# Patient Record
Sex: FEMALE | Race: WHITE | NOT HISPANIC OR LATINO | Employment: OTHER | URBAN - METROPOLITAN AREA
[De-identification: names, ages, dates, MRNs, and addresses within clinical notes are randomized per-mention and may not be internally consistent; named-entity substitution may affect disease eponyms.]

---

## 2020-04-16 ENCOUNTER — TRANSCRIBE ORDERS (OUTPATIENT)
Dept: ADMINISTRATIVE | Facility: HOSPITAL | Age: 75
End: 2020-04-16

## 2020-04-16 DIAGNOSIS — R13.14 DYSPHAGIA, PHARYNGOESOPHAGEAL PHASE: Primary | ICD-10-CM

## 2020-05-18 ENCOUNTER — HOSPITAL ENCOUNTER (OUTPATIENT)
Dept: RADIOLOGY | Facility: HOSPITAL | Age: 75
Discharge: HOME | End: 2020-05-18
Attending: OTOLARYNGOLOGY
Payer: MEDICARE

## 2020-05-18 DIAGNOSIS — R13.14 DYSPHAGIA, PHARYNGOESOPHAGEAL PHASE: ICD-10-CM

## 2020-05-18 PROCEDURE — 74220 X-RAY XM ESOPHAGUS 1CNTRST: CPT

## 2020-11-27 ENCOUNTER — APPOINTMENT (EMERGENCY)
Dept: CT IMAGING | Facility: HOSPITAL | Age: 75
End: 2020-11-27
Payer: MEDICARE

## 2020-11-27 ENCOUNTER — HOSPITAL ENCOUNTER (EMERGENCY)
Facility: HOSPITAL | Age: 75
Discharge: HOME | End: 2020-11-27
Attending: EMERGENCY MEDICINE | Admitting: EMERGENCY MEDICINE
Payer: MEDICARE

## 2020-11-27 VITALS
HEIGHT: 60 IN | OXYGEN SATURATION: 96 % | TEMPERATURE: 97.7 F | SYSTOLIC BLOOD PRESSURE: 167 MMHG | RESPIRATION RATE: 16 BRPM | WEIGHT: 181.88 LBS | BODY MASS INDEX: 35.71 KG/M2 | HEART RATE: 68 BPM | DIASTOLIC BLOOD PRESSURE: 87 MMHG

## 2020-11-27 DIAGNOSIS — R06.00 DYSPNEA: Primary | ICD-10-CM

## 2020-11-27 LAB
ALBUMIN SERPL BCP-MCNC: 3.8 G/DL (ref 3.5–5)
ALP SERPL-CCNC: 53 U/L (ref 46–116)
ALT SERPL W P-5'-P-CCNC: 34 U/L (ref 12–78)
ANION GAP SERPL CALCULATED.3IONS-SCNC: 8 MMOL/L (ref 4–13)
AST SERPL W P-5'-P-CCNC: 20 U/L (ref 5–45)
BASOPHILS # BLD AUTO: 0.03 THOUSANDS/ΜL (ref 0–0.1)
BASOPHILS NFR BLD AUTO: 0 % (ref 0–1)
BILIRUB SERPL-MCNC: 0.37 MG/DL (ref 0.2–1)
BUN SERPL-MCNC: 21 MG/DL (ref 5–25)
CALCIUM SERPL-MCNC: 9.7 MG/DL (ref 8.3–10.1)
CHLORIDE SERPL-SCNC: 101 MMOL/L (ref 100–108)
CO2 SERPL-SCNC: 28 MMOL/L (ref 21–32)
CREAT SERPL-MCNC: 1.01 MG/DL (ref 0.6–1.3)
EOSINOPHIL # BLD AUTO: 0.25 THOUSAND/ΜL (ref 0–0.61)
EOSINOPHIL NFR BLD AUTO: 2 % (ref 0–6)
ERYTHROCYTE [DISTWIDTH] IN BLOOD BY AUTOMATED COUNT: 12.8 % (ref 11.6–15.1)
GFR SERPL CREATININE-BSD FRML MDRD: 55 ML/MIN/1.73SQ M
GLUCOSE SERPL-MCNC: 171 MG/DL (ref 65–140)
HCT VFR BLD AUTO: 39.6 % (ref 34.8–46.1)
HGB BLD-MCNC: 12.9 G/DL (ref 11.5–15.4)
IMM GRANULOCYTES # BLD AUTO: 0.03 THOUSAND/UL (ref 0–0.2)
IMM GRANULOCYTES NFR BLD AUTO: 0 % (ref 0–2)
LYMPHOCYTES # BLD AUTO: 3.82 THOUSANDS/ΜL (ref 0.6–4.47)
LYMPHOCYTES NFR BLD AUTO: 37 % (ref 14–44)
MCH RBC QN AUTO: 27.4 PG (ref 26.8–34.3)
MCHC RBC AUTO-ENTMCNC: 32.6 G/DL (ref 31.4–37.4)
MCV RBC AUTO: 84 FL (ref 82–98)
MONOCYTES # BLD AUTO: 0.57 THOUSAND/ΜL (ref 0.17–1.22)
MONOCYTES NFR BLD AUTO: 6 % (ref 4–12)
NEUTROPHILS # BLD AUTO: 5.55 THOUSANDS/ΜL (ref 1.85–7.62)
NEUTS SEG NFR BLD AUTO: 55 % (ref 43–75)
NRBC BLD AUTO-RTO: 0 /100 WBCS
NT-PROBNP SERPL-MCNC: 107 PG/ML
PLATELET # BLD AUTO: 276 THOUSANDS/UL (ref 149–390)
PMV BLD AUTO: 9.7 FL (ref 8.9–12.7)
POTASSIUM SERPL-SCNC: 3.9 MMOL/L (ref 3.5–5.3)
PROT SERPL-MCNC: 7.2 G/DL (ref 6.4–8.2)
RBC # BLD AUTO: 4.71 MILLION/UL (ref 3.81–5.12)
SODIUM SERPL-SCNC: 137 MMOL/L (ref 136–145)
TROPONIN I SERPL-MCNC: <0.02 NG/ML
WBC # BLD AUTO: 10.25 THOUSAND/UL (ref 4.31–10.16)

## 2020-11-27 PROCEDURE — 85025 COMPLETE CBC W/AUTO DIFF WBC: CPT | Performed by: EMERGENCY MEDICINE

## 2020-11-27 PROCEDURE — 99285 EMERGENCY DEPT VISIT HI MDM: CPT

## 2020-11-27 PROCEDURE — 71275 CT ANGIOGRAPHY CHEST: CPT

## 2020-11-27 PROCEDURE — 36415 COLL VENOUS BLD VENIPUNCTURE: CPT | Performed by: EMERGENCY MEDICINE

## 2020-11-27 PROCEDURE — 93005 ELECTROCARDIOGRAM TRACING: CPT

## 2020-11-27 PROCEDURE — 83880 ASSAY OF NATRIURETIC PEPTIDE: CPT | Performed by: EMERGENCY MEDICINE

## 2020-11-27 PROCEDURE — 84484 ASSAY OF TROPONIN QUANT: CPT | Performed by: EMERGENCY MEDICINE

## 2020-11-27 PROCEDURE — 80053 COMPREHEN METABOLIC PANEL: CPT | Performed by: EMERGENCY MEDICINE

## 2020-11-27 PROCEDURE — G1004 CDSM NDSC: HCPCS

## 2020-11-27 PROCEDURE — 99285 EMERGENCY DEPT VISIT HI MDM: CPT | Performed by: EMERGENCY MEDICINE

## 2020-11-27 RX ORDER — INSULIN GLARGINE 100 [IU]/ML
INJECTION, SOLUTION SUBCUTANEOUS
COMMUNITY

## 2020-11-27 RX ORDER — DULOXETIN HYDROCHLORIDE 60 MG/1
120 CAPSULE, DELAYED RELEASE ORAL
COMMUNITY

## 2020-11-27 RX ORDER — LANOLIN ALCOHOL/MO/W.PET/CERES
CREAM (GRAM) TOPICAL
COMMUNITY

## 2020-11-27 RX ORDER — HYDROCHLOROTHIAZIDE 25 MG/1
25 TABLET ORAL
COMMUNITY
Start: 2017-04-28

## 2020-11-27 RX ORDER — LOSARTAN POTASSIUM 50 MG/1
25 TABLET ORAL
COMMUNITY
Start: 2020-07-06

## 2020-11-27 RX ORDER — ROSUVASTATIN CALCIUM 40 MG/1
TABLET, COATED ORAL
COMMUNITY
Start: 2018-10-24

## 2020-11-27 RX ORDER — OMEPRAZOLE 10 MG/1
CAPSULE, DELAYED RELEASE ORAL
COMMUNITY

## 2020-11-27 RX ORDER — UBIDECARENONE 200 MG
CAPSULE ORAL
COMMUNITY

## 2020-11-27 RX ADMIN — IOHEXOL 85 ML: 350 INJECTION, SOLUTION INTRAVENOUS at 15:08

## 2020-11-29 LAB
ATRIAL RATE: 71 BPM
P AXIS: 44 DEGREES
PR INTERVAL: 148 MS
QRS AXIS: -50 DEGREES
QRSD INTERVAL: 134 MS
QT INTERVAL: 466 MS
QTC INTERVAL: 506 MS
T WAVE AXIS: 73 DEGREES
VENTRICULAR RATE: 71 BPM

## 2020-11-29 PROCEDURE — 93010 ELECTROCARDIOGRAM REPORT: CPT | Performed by: INTERNAL MEDICINE

## 2021-07-30 ENCOUNTER — APPOINTMENT (EMERGENCY)
Dept: CT IMAGING | Facility: HOSPITAL | Age: 76
End: 2021-07-30
Payer: MEDICARE

## 2021-07-30 ENCOUNTER — HOSPITAL ENCOUNTER (EMERGENCY)
Facility: HOSPITAL | Age: 76
Discharge: HOME | End: 2021-07-30
Attending: EMERGENCY MEDICINE | Admitting: EMERGENCY MEDICINE
Payer: MEDICARE

## 2021-07-30 VITALS
WEIGHT: 189.6 LBS | TEMPERATURE: 97.2 F | DIASTOLIC BLOOD PRESSURE: 76 MMHG | HEART RATE: 62 BPM | SYSTOLIC BLOOD PRESSURE: 150 MMHG | RESPIRATION RATE: 20 BRPM | HEIGHT: 61 IN | BODY MASS INDEX: 35.8 KG/M2 | OXYGEN SATURATION: 94 %

## 2021-07-30 DIAGNOSIS — R06.00 DYSPNEA: ICD-10-CM

## 2021-07-30 DIAGNOSIS — R07.9 CHEST PAIN: Primary | ICD-10-CM

## 2021-07-30 LAB
ALBUMIN SERPL BCP-MCNC: 3.7 G/DL (ref 3.5–5)
ALP SERPL-CCNC: 71 U/L (ref 46–116)
ALT SERPL W P-5'-P-CCNC: 37 U/L (ref 12–78)
ANION GAP SERPL CALCULATED.3IONS-SCNC: 11 MMOL/L (ref 4–13)
AST SERPL W P-5'-P-CCNC: 19 U/L (ref 5–45)
ATRIAL RATE: 65 BPM
BASOPHILS # BLD AUTO: 0.04 THOUSANDS/ΜL (ref 0–0.1)
BASOPHILS NFR BLD AUTO: 0 % (ref 0–1)
BILIRUB SERPL-MCNC: 0.35 MG/DL (ref 0.2–1)
BUN SERPL-MCNC: 21 MG/DL (ref 5–25)
CALCIUM SERPL-MCNC: 9.3 MG/DL (ref 8.3–10.1)
CHLORIDE SERPL-SCNC: 104 MMOL/L (ref 100–108)
CO2 SERPL-SCNC: 27 MMOL/L (ref 21–32)
CREAT SERPL-MCNC: 1.1 MG/DL (ref 0.6–1.3)
EOSINOPHIL # BLD AUTO: 0.27 THOUSAND/ΜL (ref 0–0.61)
EOSINOPHIL NFR BLD AUTO: 3 % (ref 0–6)
ERYTHROCYTE [DISTWIDTH] IN BLOOD BY AUTOMATED COUNT: 13.1 % (ref 11.6–15.1)
GFR SERPL CREATININE-BSD FRML MDRD: 50 ML/MIN/1.73SQ M
GLUCOSE SERPL-MCNC: 81 MG/DL (ref 65–140)
HCT VFR BLD AUTO: 37.6 % (ref 34.8–46.1)
HGB BLD-MCNC: 12.4 G/DL (ref 11.5–15.4)
IMM GRANULOCYTES # BLD AUTO: 0.02 THOUSAND/UL (ref 0–0.2)
IMM GRANULOCYTES NFR BLD AUTO: 0 % (ref 0–2)
LYMPHOCYTES # BLD AUTO: 3.79 THOUSANDS/ΜL (ref 0.6–4.47)
LYMPHOCYTES NFR BLD AUTO: 39 % (ref 14–44)
MCH RBC QN AUTO: 27.4 PG (ref 26.8–34.3)
MCHC RBC AUTO-ENTMCNC: 33 G/DL (ref 31.4–37.4)
MCV RBC AUTO: 83 FL (ref 82–98)
MONOCYTES # BLD AUTO: 0.6 THOUSAND/ΜL (ref 0.17–1.22)
MONOCYTES NFR BLD AUTO: 6 % (ref 4–12)
NEUTROPHILS # BLD AUTO: 4.92 THOUSANDS/ΜL (ref 1.85–7.62)
NEUTS SEG NFR BLD AUTO: 52 % (ref 43–75)
NRBC BLD AUTO-RTO: 0 /100 WBCS
NT-PROBNP SERPL-MCNC: 255 PG/ML
P AXIS: 41 DEGREES
PLATELET # BLD AUTO: 281 THOUSANDS/UL (ref 149–390)
PMV BLD AUTO: 9.9 FL (ref 8.9–12.7)
POTASSIUM SERPL-SCNC: 4 MMOL/L (ref 3.5–5.3)
PR INTERVAL: 156 MS
PROT SERPL-MCNC: 7.2 G/DL (ref 6.4–8.2)
QRS AXIS: -57 DEGREES
QRSD INTERVAL: 134 MS
QT INTERVAL: 488 MS
QTC INTERVAL: 507 MS
RBC # BLD AUTO: 4.52 MILLION/UL (ref 3.81–5.12)
SODIUM SERPL-SCNC: 142 MMOL/L (ref 136–145)
T WAVE AXIS: 30 DEGREES
TROPONIN I SERPL-MCNC: <0.02 NG/ML
TROPONIN I SERPL-MCNC: <0.02 NG/ML
VENTRICULAR RATE: 65 BPM
WBC # BLD AUTO: 9.64 THOUSAND/UL (ref 4.31–10.16)

## 2021-07-30 PROCEDURE — 85025 COMPLETE CBC W/AUTO DIFF WBC: CPT | Performed by: EMERGENCY MEDICINE

## 2021-07-30 PROCEDURE — 99284 EMERGENCY DEPT VISIT MOD MDM: CPT | Performed by: EMERGENCY MEDICINE

## 2021-07-30 PROCEDURE — 83880 ASSAY OF NATRIURETIC PEPTIDE: CPT | Performed by: EMERGENCY MEDICINE

## 2021-07-30 PROCEDURE — 99285 EMERGENCY DEPT VISIT HI MDM: CPT

## 2021-07-30 PROCEDURE — 93005 ELECTROCARDIOGRAM TRACING: CPT

## 2021-07-30 PROCEDURE — 71275 CT ANGIOGRAPHY CHEST: CPT

## 2021-07-30 PROCEDURE — 36415 COLL VENOUS BLD VENIPUNCTURE: CPT | Performed by: EMERGENCY MEDICINE

## 2021-07-30 PROCEDURE — 84484 ASSAY OF TROPONIN QUANT: CPT | Performed by: EMERGENCY MEDICINE

## 2021-07-30 PROCEDURE — 80053 COMPREHEN METABOLIC PANEL: CPT | Performed by: EMERGENCY MEDICINE

## 2021-07-30 RX ORDER — ASPIRIN 81 MG/1
81 TABLET, CHEWABLE ORAL ONCE
Status: COMPLETED | OUTPATIENT
Start: 2021-07-30 | End: 2021-07-30

## 2021-07-30 RX ADMIN — IOHEXOL 100 ML: 350 INJECTION, SOLUTION INTRAVENOUS at 17:26

## 2021-07-30 RX ADMIN — ASPIRIN 81 MG: 81 TABLET, CHEWABLE ORAL at 16:34

## 2021-07-30 NOTE — DISCHARGE INSTRUCTIONS
Return immediately if worse or any new symptoms or reconsider hospitalization for further evaluation  Please call your physician as soon as possible to arrange evaluation

## 2021-07-30 NOTE — ED PROVIDER NOTES
History  Chief Complaint   Patient presents with    Chest Pain     pt c/o chest pressure/heaviness, mid back pain, and sob since sunday  states worse today at time of EMS arrival  denies any now  took 162 mg of aspirin before coming     14-year-old female complains of exertional dyspnea this morning during multiple activities, began when exited bed walking to the kitchen, after vacuuming deck carpet had some back pain radiating to neck and epigastric heaviness  This occurred around 8:00 a m  Lasted 15-20 minutes, was without associated symptoms  Past medical history includes CAD, CABG 2007  No VTE  Recent spinal compression fracture      History provided by:  Patient  Chest Pain  Pain location:  Substernal area  Pain quality: dull and pressure    Pain radiates to the back: no    Pain severity:  Moderate  Onset quality:  Gradual  Progression:  Resolved  Relieved by:  None tried  Associated symptoms: shortness of breath    Associated symptoms: no abdominal pain, no diaphoresis and no fever    Risk factors: coronary artery disease    Risk factors: no prior DVT/PE        Prior to Admission Medications   Prescriptions Last Dose Informant Patient Reported? Taking?    Aspirin Buf,CaCarb-MgCarb-MgO, 81 MG TABS   Yes No   Sig: Take 81 mg by mouth   Coenzyme Q10 (Co Q10 Maximum Strength) 200 MG capsule   Yes No   Sig: Take by mouth   DULoxetine (CYMBALTA) 60 mg delayed release capsule   Yes No   Sig: Take 120 mg by mouth   calcium citrate-vitamin D (Calcium + D) 315-200 MG-UNIT per tablet   Yes No   Sig: Take by mouth   hydrochlorothiazide (HYDRODIURIL) 25 mg tablet   Yes No   Sig: Take 25 mg by mouth   insulin glargine (Lantus SoloStar) 100 units/mL injection pen   Yes No   losartan (COZAAR) 50 mg tablet   Yes No   Sig: Take 25 mg by mouth   metFORMIN (GLUCOPHAGE) 850 mg tablet   Yes No   Sig: Take 850 mg by mouth 2 (two) times a day with meals   omeprazole (PriLOSEC) 10 mg delayed release capsule   Yes No   Sig: Take by mouth   rosuvastatin (CRESTOR) 40 MG tablet   Yes No   sitaGLIPtin (Januvia) 100 mg tablet   Yes No      Facility-Administered Medications: None       Past Medical History:   Diagnosis Date    Coronary artery disease     Diabetes mellitus ([facility] [address] )     Hypertension     Lyme disease        Past Surgical History:   Procedure Laterality Date    CORONARY ARTERY BYPASS GRAFT      x3    FRACTURE SURGERY Left     knee    HYSTERECTOMY         History reviewed  No pertinent family history  I have reviewed and agree with the history as documented  E-Cigarette/Vaping    E-Cigarette Use Never User      E-Cigarette/Vaping Substances     Social History     Tobacco Use    Smoking status: Never Smoker    Smokeless tobacco: Never Used   Vaping Use    Vaping Use: Never used   Substance Use Topics    Alcohol use: Not Currently    Drug use: Not Currently       Review of Systems   Constitutional: Negative for diaphoresis and fever  Respiratory: Positive for shortness of breath  Cardiovascular: Positive for chest pain  Gastrointestinal: Negative for abdominal pain  All other systems reviewed and are negative  Physical Exam  Physical Exam  Vitals and nursing note reviewed  Constitutional:       Comments: Pleasant, comfortable-appearing   HENT:      Head: Normocephalic and atraumatic  Eyes:      Conjunctiva/sclera: Conjunctivae normal       Pupils: Pupils are equal, round, and reactive to light  Cardiovascular:      Rate and Rhythm: Normal rate and regular rhythm  Heart sounds: Normal heart sounds  Pulmonary:      Effort: Pulmonary effort is normal       Breath sounds: Normal breath sounds  No rales  Chest:      Chest wall: No tenderness  Abdominal:      General: Bowel sounds are normal  There is no distension  Palpations: Abdomen is soft  Tenderness: There is no abdominal tenderness  Musculoskeletal:         General: Normal range of motion  Cervical back: Neck supple        Right lower leg: No tenderness  Edema present  Left lower leg: No tenderness  Edema present  Skin:     General: Skin is warm and dry  Neurological:      Mental Status: She is alert and oriented to person, place, and time  Cranial Nerves: No cranial nerve deficit  Coordination: Coordination normal    Psychiatric:         Behavior: Behavior normal          Thought Content:  Thought content normal          Judgment: Judgment normal          Vital Signs  ED Triage Vitals [07/30/21 1601]   Temperature Pulse Respirations Blood Pressure SpO2   97 5 °F (36 4 °C) 66 20 (!) 194/91 97 %      Temp Source Heart Rate Source Patient Position - Orthostatic VS BP Location FiO2 (%)   Temporal Monitor Lying Right arm --      Pain Score       No Pain           Vitals:    07/30/21 1729 07/30/21 1730 07/30/21 1745 07/30/21 1800   BP: 170/75 162/71 157/80 162/80   Pulse: 67 66 62 65   Patient Position - Orthostatic VS: Sitting Lying  Lying         Visual Acuity      ED Medications  Medications   aspirin chewable tablet 81 mg (81 mg Oral Given 7/30/21 1634)   iohexol (OMNIPAQUE) 350 MG/ML injection (SINGLE-DOSE) 100 mL (100 mL Intravenous Given 7/30/21 1726)       Diagnostic Studies  Results Reviewed     Procedure Component Value Units Date/Time    Troponin I [[id number]]  (Normal) Collected: 07/30/21 1805    Lab Status: Final result Specimen: Blood from Arm, Right Updated: 07/30/21 1830     Troponin I <0 02 ng/mL     NT-BNP PRO [[id number]]  (Abnormal) Collected: 07/30/21 1631    Lab Status: Final result Specimen: Blood from Arm, Right Updated: 07/30/21 1702     NT-proBNP 255 pg/mL     Comprehensive metabolic panel [[id number]] Collected: 07/30/21 1631    Lab Status: Final result Specimen: Blood from Arm, Right Updated: 07/30/21 1702     Sodium 142 mmol/L      Potassium 4 0 mmol/L      Chloride 104 mmol/L      CO2 27 mmol/L      ANION GAP 11 mmol/L      BUN 21 mg/dL      Creatinine 1 10 mg/dL      Glucose 81 mg/dL      Calcium 9 3 mg/dL      AST 19 U/L      ALT 37 U/L      Alkaline Phosphatase 71 U/L      Total Protein 7 2 g/dL      Albumin 3 7 g/dL      Total Bilirubin 0 35 mg/dL      eGFR 50 ml/min/1 73sq m     Narrative:      National Kid[employer] guidelines for Chronic Kidney Disease (CKD):     Stage 1 with normal or high GFR (GFR > 90 mL/min/1 73 square meters)    Stage 2 Mild CKD (GFR = 60-89 mL/min/1 73 square meters)    Stage 3A Moderate CKD (GFR = 45-59 mL/min/1 73 square meters)    Stage 3B Moderate CKD (GFR = 30-44 mL/min/1 73 square meters)    Stage 4 Severe CKD (GFR = 15-29 mL/min/1 73 square meters)    Stage 5 End Stage CKD (GFR <15 mL/min/1 73 square meters)  Note: GFR calculation is accurate only with a steady state creatinine    Troponin I [[id number]]  (Normal) Collected: 07/30/21 1631    Lab Status: Final result Specimen: Blood from Arm, Right Updated: 07/30/21 1700     Troponin I <0 02 ng/mL     CBC and differential [[id number]] Collected: 07/30/21 1631    Lab Status: Final result Specimen: Blood from Arm, Right Updated: 07/30/21 1639     WBC 9 64 Thousand/uL      RBC 4 52 Million/uL      Hemoglobin 12 4 g/dL      Hematocrit 37 6 %      MCV 83 fL      MCH 27 4 pg      MCHC 33 0 g/dL      RDW 13 1 %      MPV 9 9 fL      Platelets 058 Thousands/uL      nRBC 0 /100 WBCs      Neutrophils Relative 52 %      Immat GRANS % 0 %      Lymphocytes Relative 39 %      Monocytes Relative 6 %      Eosinophils Relative 3 %      Basophils Relative 0 %      Neutrophils Absolute 4 92 Thousands/µL      Immature Grans Absolute 0 02 Thousand/uL      Lymphocytes Absolute 3 79 Thousands/µL      Monocytes Absolute 0 60 Thousand/µL      Eosinophils Absolute 0 27 Thousand/µL      Basophils Absolute 0 04 Thousands/µL                  CTA ED chest PE study   Final Result by Ayana Jackson MD (07/30 8863)      No acute findings; no pulmonary arterial embolism or pulmonary infiltrate/consolidation                    Workstation performed: [id number]                    Procedures  Procedures         ED Course  ED Course as of Jul 30 1851 Fri Jul 30, 2021   1619 EKG 4:00 p m  Normal sinus rhythm rate 65 left axis intraventricular conduction delay anterior T-wave inversions no ST elevation or depression interpreted by me      1726 Troponin I: <0 02   1727 WBC: 9 64   1800 Currently comfortable except mild neck ache with movement, no abdominal pain, abdomen benign, discussed observation and request discharge home, agreeable to repeating troponin, son present and supportive      1843 Remains comfortable, we discussed options including hospitalization, again choose is discharged home, son present and supportive                                              MDM    Disposition  Final diagnoses:   Chest pain   Dyspnea     Time reflects when diagnosis was documented in both MDM as applicable and the Disposition within this note     Time User Action Codes Description Comment    7/30/2021  6:45 PM Merline Poor Add [R07 9] Chest pain     7/30/2021  6:45 PM Merline Poor Add [R06 00] Dyspnea       ED Disposition     ED Disposition Condition Date/Time Comment    Discharge Stable Fri Jul 30, 2021  6:45 PM Haris Cristobal discharge to home/self care  Follow-up Information     Follow up With Specialties Details Why Contact Info    Noemí Leal MD Internal Medicine Schedule an appointment as soon as possible for a visit in 1 week  [address] [place] [address]  [phone]            Patient's Medications   Discharge Prescriptions    No medications on file     No discharge procedures on file      PDMP Review     None          ED Provider  Electronically Signed by           Virgilio Adkins DO  07/30/21 4821

## 2024-01-08 ENCOUNTER — HOSPITAL ENCOUNTER (EMERGENCY)
Facility: HOSPITAL | Age: 79
Discharge: HOME | DRG: 392 | End: 2024-01-08
Attending: EMERGENCY MEDICINE
Payer: MEDICARE

## 2024-01-08 ENCOUNTER — APPOINTMENT (EMERGENCY)
Dept: CT IMAGING | Facility: HOSPITAL | Age: 79
DRG: 392 | End: 2024-01-08
Payer: MEDICARE

## 2024-01-08 VITALS
SYSTOLIC BLOOD PRESSURE: 144 MMHG | OXYGEN SATURATION: 95 % | DIASTOLIC BLOOD PRESSURE: 76 MMHG | TEMPERATURE: 97.8 F | RESPIRATION RATE: 16 BRPM | HEART RATE: 87 BPM

## 2024-01-08 DIAGNOSIS — K57.92 DIVERTICULITIS: ICD-10-CM

## 2024-01-08 DIAGNOSIS — R10.32 LEFT LOWER QUADRANT ABDOMINAL PAIN: Primary | ICD-10-CM

## 2024-01-08 LAB
ALBUMIN SERPL BCP-MCNC: 4.1 G/DL (ref 3.5–5)
ALP SERPL-CCNC: 51 U/L (ref 34–104)
ALT SERPL W P-5'-P-CCNC: 12 U/L (ref 7–52)
ANION GAP SERPL CALCULATED.3IONS-SCNC: 8 MMOL/L
AST SERPL W P-5'-P-CCNC: 27 U/L (ref 13–39)
BASOPHILS # BLD AUTO: 0.04 THOUSANDS/ÂΜL (ref 0–0.1)
BASOPHILS NFR BLD AUTO: 0 % (ref 0–1)
BILIRUB SERPL-MCNC: 0.58 MG/DL (ref 0.2–1)
BUN SERPL-MCNC: 18 MG/DL (ref 5–25)
CALCIUM SERPL-MCNC: 9.6 MG/DL (ref 8.4–10.2)
CHLORIDE SERPL-SCNC: 103 MMOL/L (ref 96–108)
CO2 SERPL-SCNC: 25 MMOL/L (ref 21–32)
CREAT SERPL-MCNC: 0.89 MG/DL (ref 0.6–1.3)
EOSINOPHIL # BLD AUTO: 0.07 THOUSAND/ÂΜL (ref 0–0.61)
EOSINOPHIL NFR BLD AUTO: 1 % (ref 0–6)
ERYTHROCYTE [DISTWIDTH] IN BLOOD BY AUTOMATED COUNT: 16.3 % (ref 11.6–15.1)
GFR SERPL CREATININE-BSD FRML MDRD: 63 ML/MIN/1.73SQ M
GLUCOSE SERPL-MCNC: 121 MG/DL (ref 65–140)
HCT VFR BLD AUTO: 42.8 % (ref 34.8–46.1)
HGB BLD-MCNC: 13.3 G/DL (ref 11.5–15.4)
IMM GRANULOCYTES # BLD AUTO: 0.05 THOUSAND/UL (ref 0–0.2)
IMM GRANULOCYTES NFR BLD AUTO: 0 % (ref 0–2)
LACTATE SERPL-SCNC: 1 MMOL/L (ref 0.5–2)
LYMPHOCYTES # BLD AUTO: 2.07 THOUSANDS/ÂΜL (ref 0.6–4.47)
LYMPHOCYTES NFR BLD AUTO: 18 % (ref 14–44)
MCH RBC QN AUTO: 25.8 PG (ref 26.8–34.3)
MCHC RBC AUTO-ENTMCNC: 31.1 G/DL (ref 31.4–37.4)
MCV RBC AUTO: 83 FL (ref 82–98)
MONOCYTES # BLD AUTO: 0.56 THOUSAND/ÂΜL (ref 0.17–1.22)
MONOCYTES NFR BLD AUTO: 5 % (ref 4–12)
NEUTROPHILS # BLD AUTO: 8.46 THOUSANDS/ÂΜL (ref 1.85–7.62)
NEUTS SEG NFR BLD AUTO: 76 % (ref 43–75)
NRBC BLD AUTO-RTO: 0 /100 WBCS
PLATELET # BLD AUTO: 228 THOUSANDS/UL (ref 149–390)
PMV BLD AUTO: 10 FL (ref 8.9–12.7)
POTASSIUM SERPL-SCNC: 5.7 MMOL/L (ref 3.5–5.3)
PROT SERPL-MCNC: 7.5 G/DL (ref 6.4–8.4)
RBC # BLD AUTO: 5.15 MILLION/UL (ref 3.81–5.12)
SODIUM SERPL-SCNC: 136 MMOL/L (ref 135–147)
WBC # BLD AUTO: 11.25 THOUSAND/UL (ref 4.31–10.16)

## 2024-01-08 PROCEDURE — 87040 BLOOD CULTURE FOR BACTERIA: CPT | Performed by: EMERGENCY MEDICINE

## 2024-01-08 PROCEDURE — 96361 HYDRATE IV INFUSION ADD-ON: CPT

## 2024-01-08 PROCEDURE — 85025 COMPLETE CBC W/AUTO DIFF WBC: CPT | Performed by: EMERGENCY MEDICINE

## 2024-01-08 PROCEDURE — G1004 CDSM NDSC: HCPCS

## 2024-01-08 PROCEDURE — 99284 EMERGENCY DEPT VISIT MOD MDM: CPT

## 2024-01-08 PROCEDURE — 36415 COLL VENOUS BLD VENIPUNCTURE: CPT | Performed by: EMERGENCY MEDICINE

## 2024-01-08 PROCEDURE — 74177 CT ABD & PELVIS W/CONTRAST: CPT

## 2024-01-08 PROCEDURE — 99285 EMERGENCY DEPT VISIT HI MDM: CPT | Performed by: EMERGENCY MEDICINE

## 2024-01-08 PROCEDURE — 80053 COMPREHEN METABOLIC PANEL: CPT | Performed by: EMERGENCY MEDICINE

## 2024-01-08 PROCEDURE — 96365 THER/PROPH/DIAG IV INF INIT: CPT

## 2024-01-08 PROCEDURE — 83605 ASSAY OF LACTIC ACID: CPT | Performed by: EMERGENCY MEDICINE

## 2024-01-08 PROCEDURE — 96375 TX/PRO/DX INJ NEW DRUG ADDON: CPT

## 2024-01-08 RX ORDER — SODIUM CHLORIDE, SODIUM LACTATE, POTASSIUM CHLORIDE, CALCIUM CHLORIDE 600; 310; 30; 20 MG/100ML; MG/100ML; MG/100ML; MG/100ML
150 INJECTION, SOLUTION INTRAVENOUS CONTINUOUS
Status: DISCONTINUED | OUTPATIENT
Start: 2024-01-08 | End: 2024-01-08 | Stop reason: HOSPADM

## 2024-01-08 RX ORDER — ONDANSETRON 2 MG/ML
4 INJECTION INTRAMUSCULAR; INTRAVENOUS ONCE
Status: COMPLETED | OUTPATIENT
Start: 2024-01-08 | End: 2024-01-08

## 2024-01-08 RX ORDER — OXYCODONE HYDROCHLORIDE 5 MG/1
5 TABLET ORAL EVERY 6 HOURS PRN
Qty: 15 TABLET | Refills: 0 | Status: SHIPPED | OUTPATIENT
Start: 2024-01-08

## 2024-01-08 RX ORDER — AMOXICILLIN AND CLAVULANATE POTASSIUM 875; 125 MG/1; MG/1
1 TABLET, FILM COATED ORAL EVERY 12 HOURS
Qty: 14 TABLET | Refills: 0 | Status: SHIPPED | OUTPATIENT
Start: 2024-01-08 | End: 2024-01-15

## 2024-01-08 RX ADMIN — ONDANSETRON 4 MG: 2 INJECTION INTRAMUSCULAR; INTRAVENOUS at 14:25

## 2024-01-08 RX ADMIN — AMPICILLIN SODIUM AND SULBACTAM SODIUM 3 G: 2; 1 INJECTION, POWDER, FOR SOLUTION INTRAMUSCULAR; INTRAVENOUS at 16:34

## 2024-01-08 RX ADMIN — SODIUM CHLORIDE, SODIUM LACTATE, POTASSIUM CHLORIDE, AND CALCIUM CHLORIDE 150 ML/HR: .6; .31; .03; .02 INJECTION, SOLUTION INTRAVENOUS at 14:26

## 2024-01-08 RX ADMIN — IOHEXOL 100 ML: 350 INJECTION, SOLUTION INTRAVENOUS at 15:50

## 2024-01-08 RX ADMIN — MORPHINE SULFATE 2 MG: 2 INJECTION, SOLUTION INTRAMUSCULAR; INTRAVENOUS at 14:26

## 2024-01-08 NOTE — DISCHARGE INSTRUCTIONS
Please take antibiotics as instructed and until complete.  However, please return with any worsening symptoms.  You were also prescribed pain medication which at times may be sedating, constipating and also addicting.  Please use it for severe pain.  For mild to moderate pain you may use Tylenol.    We also recommend that you follow-up with gastroenterology.  We have placed a consultation for you and provided you that phone number.    Thank you for choosing the emergency department at [facility]. We appreciated the opportunity and privilege to address your healthcare needs. We remain available to you should you require additional evaluation or assistance. We value your feedback and would appreciate the opportunity to address anything you identified as an opportunity to improve or where we excelled. If there are colleagues who deserve special recognition, please let us know! We hope you are feeling better soon!    Please also note that sometimes there are subtle abnormalities in your lab values that you may observe when you access your record online.  These are frequently not worrisome and if they are of concern we will have discussed them with you.  However, we always encourage that you discuss any concerns you may have or observe on your record with your primary care provider.  Please also be aware that voice transcription will occasionally recognize words or grammar differently than what was spoken.

## 2024-01-08 NOTE — ED PROVIDER NOTES
History  Chief Complaint   Patient presents with    Abdominal Pain     Pt presents with lower abd pain, worse on left side starting Saturday. Pt reports pain radiating to back. Denies urinary complaints. Seen at urgent care [facility] and had urine sample done, told no infection.      Pleasant 78-year-old female with a history of diabetes, coronary artery disease, hypertension, diverticulosis, total abdominal hysterectomy is presenting to the emergency room with chief complaint of left lower quadrant abdominal pain which has been ongoing since 2 to 3 days ago.  Radiates to her lower back.  Denies any urinary complaints.  Denies any fevers.  Denies any nausea or vomiting.  Denies any diarrhea.  Patient has had some similar discomfort in the past related to diverticulitis.  Pain has not been as severe as this episode.  She was seen in urgent care and referred to the emergency room for further evaluation.  Patient reports that the pain is worse with movement, car rides, and ambulation      Abdominal Pain  Pain location:  LLQ  Pain quality: aching and sharp    Pain radiation: Left lower back.  Pain severity:  Severe  Onset quality:  Gradual  Timing:  Constant  Progression:  Worsening  Chronicity:  New  Relieved by:  Nothing  Ineffective treatments:  Movement, position changes and palpation  Associated symptoms: anorexia    Associated symptoms: no belching, no chest pain, no constipation, no cough, no diarrhea, no dysuria, no fever, no flatus, no hematemesis, no hematochezia, no hematuria, no nausea and no shortness of breath    Risk factors: being elderly and multiple surgeries        Prior to Admission Medications   Prescriptions Last Dose Informant Patient Reported? Taking?   Aspirin Buf,CaCarb-MgCarb-MgO, 81 MG TABS   Yes No   Sig: Take 81 mg by mouth   Coenzyme Q10 (Co Q10 Maximum Strength) 200 MG capsule   Yes No   Sig: Take by mouth   DULoxetine (CYMBALTA) 60 mg delayed release capsule   Yes No   Sig: Take 120 mg by  mouth   calcium citrate-vitamin D (Calcium + D) 315-200 MG-UNIT per tablet   Yes No   Sig: Take by mouth   hydrochlorothiazide (HYDRODIURIL) 25 mg tablet   Yes No   Sig: Take 25 mg by mouth   insulin glargine (Lantus SoloStar) 100 units/mL injection pen   Yes No   losartan (COZAAR) 50 mg tablet   Yes No   Sig: Take 25 mg by mouth   metFORMIN (GLUCOPHAGE) 850 mg tablet   Yes No   Sig: Take 850 mg by mouth 2 (two) times a day with meals   omeprazole (PriLOSEC) 10 mg delayed release capsule   Yes No   Sig: Take by mouth   rosuvastatin (CRESTOR) 40 MG tablet   Yes No   sitaGLIPtin (Januvia) 100 mg tablet   Yes No      Facility-Administered Medications: None       Past Medical History:   Diagnosis Date    Coronary artery disease     Diabetes mellitus (HCC)     Hypertension     Lyme disease        Past Surgical History:   Procedure Laterality Date    CORONARY ARTERY BYPASS GRAFT      x3    FRACTURE SURGERY Left     knee    HYSTERECTOMY         History reviewed. No pertinent family history.  I have reviewed and agree with the history as documented.    E-Cigarette/Vaping    E-Cigarette Use Never User      E-Cigarette/Vaping Substances     Social History     Tobacco Use    Smoking status: Never    Smokeless tobacco: Never   Vaping Use    Vaping status: Never Used   Substance Use Topics    Alcohol use: Not Currently    Drug use: Not Currently       Review of Systems   Constitutional:  Negative for fever.   Respiratory: Negative.  Negative for cough and shortness of breath.    Cardiovascular: Negative.  Negative for chest pain.   Gastrointestinal:  Positive for abdominal pain and anorexia. Negative for constipation, diarrhea, flatus, hematemesis, hematochezia and nausea.   Genitourinary: Negative.  Negative for dysuria, hematuria and urgency.   All other systems reviewed and are negative.      Physical Exam  Physical Exam  Vitals and nursing note reviewed.   Constitutional:       General: She is awake. She is in acute  distress.      Appearance: Normal appearance. She is well-developed. She is not ill-appearing or toxic-appearing.   HENT:      Head: Normocephalic and atraumatic.      Right Ear: External ear normal.      Left Ear: External ear normal.      Nose: Nose normal.      Mouth/Throat:      Mouth: Mucous membranes are moist.   Eyes:      General: Lids are normal. No scleral icterus.     Extraocular Movements: Extraocular movements intact.      Pupils: Pupils are equal, round, and reactive to light.   Cardiovascular:      Rate and Rhythm: Normal rate and regular rhythm.      Heart sounds: Normal heart sounds. No murmur heard.  Pulmonary:      Effort: Pulmonary effort is normal. No respiratory distress.      Breath sounds: Normal breath sounds. No wheezing, rhonchi or rales.   Abdominal:      General: Abdomen is flat. There is no distension.      Palpations: Abdomen is soft.      Tenderness: There is abdominal tenderness in the left lower quadrant. There is no guarding or rebound.   Musculoskeletal:         General: No swelling, tenderness or deformity. Normal range of motion.      Cervical back: Normal range of motion and neck supple.   Skin:     General: Skin is warm and dry.      Coloration: Skin is not jaundiced or pale.      Findings: No rash.   Neurological:      Mental Status: She is alert and oriented to person, place, and time. Mental status is at baseline.      Cranial Nerves: No cranial nerve deficit.      Motor: No weakness.   Psychiatric:         Attention and Perception: Attention normal.         Mood and Affect: Mood normal.         Speech: Speech normal.         Behavior: Behavior normal.         Vital Signs  ED Triage Vitals [01/08/24 1326]   Temperature Pulse Respirations Blood Pressure SpO2   97.8 °F (36.6 °C) 70 18 148/83 94 %      Temp Source Heart Rate Source Patient Position - Orthostatic VS BP Location FiO2 (%)   Temporal Monitor Sitting Left arm --      Pain Score       10 - Worst Possible Pain            Vitals:    01/08/24 1326 01/08/24 1537   BP: 148/83 151/79   Pulse: 70 83   Patient Position - Orthostatic VS: Sitting Lying         Visual Acuity      ED Medications  Medications   lactated ringers infusion (150 mL/hr Intravenous New Bag 1/8/24 1426)   ampicillin-sulbactam (UNASYN) 3 g in sodium chloride 0.9 % 100 mL IVPB (has no administration in time range)   ondansetron (ZOFRAN) injection 4 mg (4 mg Intravenous Given 1/8/24 1425)   morphine injection 2 mg (2 mg Intravenous Given 1/8/24 1426)   iohexol (OMNIPAQUE) 350 MG/ML injection (MULTI-DOSE) 100 mL (100 mL Intravenous Given 1/8/24 1550)       Diagnostic Studies  Results Reviewed       Procedure Component Value Units Date/Time    Comprehensive metabolic panel [[id number]]  (Abnormal) Collected: 01/08/24 1420    Lab Status: Final result Specimen: Blood from Arm, Right Updated: 01/08/24 1448     Sodium 136 mmol/L      Potassium 5.7 mmol/L      Chloride 103 mmol/L      CO2 25 mmol/L      ANION GAP 8 mmol/L      BUN 18 mg/dL      Creatinine 0.89 mg/dL      Glucose 121 mg/dL      Calcium 9.6 mg/dL      AST 27 U/L      ALT 12 U/L      Alkaline Phosphatase 51 U/L      Total Protein 7.5 g/dL      Albumin 4.1 g/dL      Total Bilirubin 0.58 mg/dL      eGFR 63 ml/min/1.73sq m     Narrative:      National Kid[employer] Foundation guidelines for Chronic Kidney Disease (CKD):     Stage 1 with normal or high GFR (GFR > 90 mL/min/1.73 square meters)    Stage 2 Mild CKD (GFR = 60-89 mL/min/1.73 square meters)    Stage 3A Moderate CKD (GFR = 45-59 mL/min/1.73 square meters)    Stage 3B Moderate CKD (GFR = 30-44 mL/min/1.73 square meters)    Stage 4 Severe CKD (GFR = 15-29 mL/min/1.73 square meters)    Stage 5 End Stage CKD (GFR <15 mL/min/1.73 square meters)  Note: GFR calculation is accurate only with a steady state creatinine    Lactic acid, plasma (w/reflex if result > 2.0) [[id number]]  (Normal) Collected: 01/08/24 1420    Lab Status: Final result Specimen: Blood from  Arm, Right Updated: 01/08/24 1448     LACTIC ACID 1.0 mmol/L     Narrative:      Result may be elevated if tourniquet was used during collection.    CBC and differential [[id number]]  (Abnormal) Collected: 01/08/24 1420    Lab Status: Final result Specimen: Blood from Arm, Right Updated: 01/08/24 1426     WBC 11.25 Thousand/uL      RBC 5.15 Million/uL      Hemoglobin 13.3 g/dL      Hematocrit 42.8 %      MCV 83 fL      MCH 25.8 pg      MCHC 31.1 g/dL      RDW 16.3 %      MPV 10.0 fL      Platelets 228 Thousands/uL      nRBC 0 /100 WBCs      Neutrophils Relative 76 %      Immat GRANS % 0 %      Lymphocytes Relative 18 %      Monocytes Relative 5 %      Eosinophils Relative 1 %      Basophils Relative 0 %      Neutrophils Absolute 8.46 Thousands/µL      Immature Grans Absolute 0.05 Thousand/uL      Lymphocytes Absolute 2.07 Thousands/µL      Monocytes Absolute 0.56 Thousand/µL      Eosinophils Absolute 0.07 Thousand/µL      Basophils Absolute 0.04 Thousands/µL     Blood culture #1 [[id number]] Collected: 01/08/24 1420    Lab Status: In process Specimen: Blood from Arm, Right Updated: 01/08/24 1423    Blood culture #2 [[id number]]     Lab Status: No result Specimen: Blood     UA (URINE) with reflex to Scope [[id number]]     Lab Status: No result Specimen: Urine                    CT abdomen pelvis with contrast   Final Result by Sheldon Dominique MD (01/08 1618)      Abnormal wall thickening, diverticula and adjacent fat stranding suggestive of diverticulitis at the distal descending/proximal sigmoid colon. However, follow-up colonoscopy is recommended after the acute episode to exclude an underlying mass.      The study was marked in EPIC for immediate notification.            Workstation performed: [id number]                    Procedures  Procedures         ED Course  ED Course as of 01/08/24 1632   Mon Jan 08, 2024   1450 Potassium was hemolyzed.   1620 Diverticulitis noted.   1627 Patient's abdomen is benign at  this time.  Patient stable for discharge following intravenous antibiotics.  Patient comfortable with this outpatient plan.  Also instructed to follow-up with gastroenterology.                               SBIRT 22yo+      Flowsheet Row Most Recent Value   Initial Alcohol Screen: US AUDIT-C     1. How often do you have a drink containing alcohol? 0 Filed at: 01/08/2024 1327   2. How many drinks containing alcohol do you have on a typical day you are drinking?  0 Filed at: 01/08/2024 1327   3b. FEMALE Any Age, or MALE 65+: How often do you have 4 or more drinks on one occassion? 0 Filed at: 01/08/2024 1327   Audit-C Score 0 Filed at: 01/08/2024 1327   YARELY: How many times in the past year have you...    Used an illegal drug or used a prescription medication for non-medical reasons? Never Filed at: 01/08/2024 1327                      Medical Decision Making  Patient presented to the emergency department and a MSE was performed. The patient was evaluated for complaint related to acute abdominal pain in a female patient.  Patient is potentially at risk for, but not limited to, acute gastritis, pancreatitis, biliary colic, cholecystitis, diverticulitis, diverticulosis, urinary infection, kidney stone, appendicitis, ulcerative colitis, Crohn's disease, enteritis, viral gastroenteritis, constipation, genitourinary infection or other disease process unrelated to the abdomen which may cause this symptomatology is also considered. Several of these diagnoses have been evaluated and ruled out by history and physical.  As needed, patient will be further evaluated with laboratory and imaging studies.  Higher level diagnostics, such as CT imaging or ultrasound, may also be required.  Please see work-up portion of the note for further evaluation of patient's risk.  Socioeconomic factors were also considered as part of the decision-making process.  Unless otherwise stated in the chart or patient is admitted as elsewhere  documented, any previously prescribed medications will be maintained.      Problems Addressed:  Diverticulitis: chronic illness or injury with exacerbation, progression, or side effects of treatment  Left lower quadrant abdominal pain: acute illness or injury    Amount and/or Complexity of Data Reviewed  Labs: ordered.  Radiology: ordered.    Risk  Prescription drug management.             Disposition  Final diagnoses:   Left lower quadrant abdominal pain   Diverticulitis     Time reflects when diagnosis was documented in both MDM as applicable and the Disposition within this note       Time User Action Codes Description Comment    1/8/2024  4:28 PM Helder Angeles [R10.32] Left lower quadrant abdominal pain     1/8/2024  4:28 PM Helder Angeles [K57.92] Diverticulitis           ED Disposition       ED Disposition   Discharge    Condition   Stable    Date/Time   Mon Jan 8, 2024 1627    Comment   Radha May discharge to home/self care.                   Follow-up Information       Follow up With Specialties Details Why Contact Info    Missael Victoria MD Gastroenterology In 1 month  [address] [facility] [address]  [phone]              Patient's Medications   Discharge Prescriptions    AMOXICILLIN-CLAVULANATE (AUGMENTIN) 875-125 MG PER TABLET    Take 1 tablet by mouth every 12 (twelve) hours for 7 days       Start Date: 1/8/2024  End Date: 1/15/2024       Order Dose: 1 tablet       Quantity: 14 tablet    Refills: 0    OXYCODONE (ROXICODONE) 5 IMMEDIATE RELEASE TABLET    Take 1 tablet (5 mg total) by mouth every 6 (six) hours as needed for severe pain for up to 15 doses Max Daily Amount: 20 mg       Start Date: 1/8/2024  End Date: --       Order Dose: 5 mg       Quantity: 15 tablet    Refills: 0           PDMP Review       None            ED Provider  Electronically Signed by             Helder Angeles DO  01/08/24 5888

## 2024-01-09 ENCOUNTER — TELEPHONE (OUTPATIENT)
Age: 79
End: 2024-01-09

## 2024-01-09 NOTE — TELEPHONE ENCOUNTER
Patient calling to schedule an ER follow up appointment. Patient was seen at [facility] on 1/8/24. Phone call transferred to [facility] to help assist scheduling appointment.

## 2024-01-10 ENCOUNTER — HOSPITAL ENCOUNTER (INPATIENT)
Facility: HOSPITAL | Age: 79
LOS: 4 days | Discharge: HOME | DRG: 392 | End: 2024-01-14
Attending: EMERGENCY MEDICINE | Admitting: FAMILY MEDICINE
Payer: MEDICARE

## 2024-01-10 ENCOUNTER — APPOINTMENT (EMERGENCY)
Dept: CT IMAGING | Facility: HOSPITAL | Age: 79
DRG: 392 | End: 2024-01-10
Payer: MEDICARE

## 2024-01-10 DIAGNOSIS — K57.20 DIVERTICULITIS OF LARGE INTESTINE WITH ABSCESS WITHOUT BLEEDING: ICD-10-CM

## 2024-01-10 DIAGNOSIS — I10 ESSENTIAL HYPERTENSION: ICD-10-CM

## 2024-01-10 DIAGNOSIS — R11.2 NAUSEA AND VOMITING: Primary | ICD-10-CM

## 2024-01-10 DIAGNOSIS — K57.92 ACUTE DIVERTICULITIS: ICD-10-CM

## 2024-01-10 PROBLEM — Z79.4 TYPE 2 DIABETES MELLITUS WITH HYPOGLYCEMIA WITHOUT COMA, WITH LONG-TERM CURRENT USE OF INSULIN (HCC): Status: ACTIVE | Noted: 2024-01-10

## 2024-01-10 PROBLEM — E78.2 HYPERLIPIDEMIA, MIXED: Status: ACTIVE | Noted: 2024-01-10

## 2024-01-10 PROBLEM — E11.649 TYPE 2 DIABETES MELLITUS WITH HYPOGLYCEMIA WITHOUT COMA, WITH LONG-TERM CURRENT USE OF INSULIN (HCC): Status: ACTIVE | Noted: 2024-01-10

## 2024-01-10 LAB
ALBUMIN SERPL BCP-MCNC: 4.2 G/DL (ref 3.5–5)
ALP SERPL-CCNC: 57 U/L (ref 34–104)
ALT SERPL W P-5'-P-CCNC: 14 U/L (ref 7–52)
ANION GAP SERPL CALCULATED.3IONS-SCNC: 9 MMOL/L
AST SERPL W P-5'-P-CCNC: 16 U/L (ref 13–39)
BASOPHILS # BLD AUTO: 0.03 THOUSANDS/ÂΜL (ref 0–0.1)
BASOPHILS NFR BLD AUTO: 0 % (ref 0–1)
BILIRUB SERPL-MCNC: 0.46 MG/DL (ref 0.2–1)
BILIRUB UR QL STRIP: NEGATIVE
BUN SERPL-MCNC: 21 MG/DL (ref 5–25)
CALCIUM SERPL-MCNC: 10 MG/DL (ref 8.4–10.2)
CHLORIDE SERPL-SCNC: 102 MMOL/L (ref 96–108)
CLARITY UR: CLEAR
CO2 SERPL-SCNC: 26 MMOL/L (ref 21–32)
COLOR UR: YELLOW
CREAT SERPL-MCNC: 1.02 MG/DL (ref 0.6–1.3)
EOSINOPHIL # BLD AUTO: 0.16 THOUSAND/ÂΜL (ref 0–0.61)
EOSINOPHIL NFR BLD AUTO: 2 % (ref 0–6)
ERYTHROCYTE [DISTWIDTH] IN BLOOD BY AUTOMATED COUNT: 15.4 % (ref 11.6–15.1)
FLUAV RNA RESP QL NAA+PROBE: NEGATIVE
FLUBV RNA RESP QL NAA+PROBE: NEGATIVE
GFR SERPL CREATININE-BSD FRML MDRD: 53 ML/MIN/1.73SQ M
GLUCOSE SERPL-MCNC: 137 MG/DL (ref 65–140)
GLUCOSE SERPL-MCNC: 74 MG/DL (ref 65–140)
GLUCOSE SERPL-MCNC: 79 MG/DL (ref 65–140)
GLUCOSE UR STRIP-MCNC: ABNORMAL MG/DL
HCT VFR BLD AUTO: 41.9 % (ref 34.8–46.1)
HGB BLD-MCNC: 13.3 G/DL (ref 11.5–15.4)
HGB UR QL STRIP.AUTO: NEGATIVE
IMM GRANULOCYTES # BLD AUTO: 0.03 THOUSAND/UL (ref 0–0.2)
IMM GRANULOCYTES NFR BLD AUTO: 0 % (ref 0–2)
KETONES UR STRIP-MCNC: NEGATIVE MG/DL
LACTATE SERPL-SCNC: 1.5 MMOL/L (ref 0.5–2)
LEUKOCYTE ESTERASE UR QL STRIP: NEGATIVE
LYMPHOCYTES # BLD AUTO: 2.05 THOUSANDS/ÂΜL (ref 0.6–4.47)
LYMPHOCYTES NFR BLD AUTO: 30 % (ref 14–44)
MCH RBC QN AUTO: 25.9 PG (ref 26.8–34.3)
MCHC RBC AUTO-ENTMCNC: 31.7 G/DL (ref 31.4–37.4)
MCV RBC AUTO: 82 FL (ref 82–98)
MONOCYTES # BLD AUTO: 0.45 THOUSAND/ÂΜL (ref 0.17–1.22)
MONOCYTES NFR BLD AUTO: 7 % (ref 4–12)
NEUTROPHILS # BLD AUTO: 4.03 THOUSANDS/ÂΜL (ref 1.85–7.62)
NEUTS SEG NFR BLD AUTO: 61 % (ref 43–75)
NITRITE UR QL STRIP: NEGATIVE
NRBC BLD AUTO-RTO: 0 /100 WBCS
PH UR STRIP.AUTO: 7 [PH]
PLATELET # BLD AUTO: 257 THOUSANDS/UL (ref 149–390)
PMV BLD AUTO: 9.7 FL (ref 8.9–12.7)
POTASSIUM SERPL-SCNC: 4 MMOL/L (ref 3.5–5.3)
PROCALCITONIN SERPL-MCNC: 0.06 NG/ML
PROT SERPL-MCNC: 7.7 G/DL (ref 6.4–8.4)
PROT UR STRIP-MCNC: NEGATIVE MG/DL
RBC # BLD AUTO: 5.13 MILLION/UL (ref 3.81–5.12)
RSV RNA RESP QL NAA+PROBE: NEGATIVE
SARS-COV-2 RNA RESP QL NAA+PROBE: NEGATIVE
SODIUM SERPL-SCNC: 137 MMOL/L (ref 135–147)
SP GR UR STRIP.AUTO: 1.01 (ref 1–1.03)
UROBILINOGEN UR QL STRIP.AUTO: 1 E.U./DL
WBC # BLD AUTO: 6.75 THOUSAND/UL (ref 4.31–10.16)

## 2024-01-10 PROCEDURE — 99223 1ST HOSP IP/OBS HIGH 75: CPT | Performed by: FAMILY MEDICINE

## 2024-01-10 PROCEDURE — 36415 COLL VENOUS BLD VENIPUNCTURE: CPT | Performed by: PHYSICIAN ASSISTANT

## 2024-01-10 PROCEDURE — G1004 CDSM NDSC: HCPCS

## 2024-01-10 PROCEDURE — 74177 CT ABD & PELVIS W/CONTRAST: CPT

## 2024-01-10 PROCEDURE — 87040 BLOOD CULTURE FOR BACTERIA: CPT | Performed by: PHYSICIAN ASSISTANT

## 2024-01-10 PROCEDURE — 96376 TX/PRO/DX INJ SAME DRUG ADON: CPT

## 2024-01-10 PROCEDURE — 99284 EMERGENCY DEPT VISIT MOD MDM: CPT

## 2024-01-10 PROCEDURE — 99285 EMERGENCY DEPT VISIT HI MDM: CPT | Performed by: PHYSICIAN ASSISTANT

## 2024-01-10 PROCEDURE — 83605 ASSAY OF LACTIC ACID: CPT | Performed by: PHYSICIAN ASSISTANT

## 2024-01-10 PROCEDURE — 85025 COMPLETE CBC W/AUTO DIFF WBC: CPT | Performed by: PHYSICIAN ASSISTANT

## 2024-01-10 PROCEDURE — 0241U HB NFCT DS VIR RESP RNA 4 TRGT: CPT | Performed by: PHYSICIAN ASSISTANT

## 2024-01-10 PROCEDURE — 80053 COMPREHEN METABOLIC PANEL: CPT | Performed by: PHYSICIAN ASSISTANT

## 2024-01-10 PROCEDURE — 82948 REAGENT STRIP/BLOOD GLUCOSE: CPT

## 2024-01-10 PROCEDURE — 84145 PROCALCITONIN (PCT): CPT | Performed by: PHYSICIAN ASSISTANT

## 2024-01-10 PROCEDURE — 81003 URINALYSIS AUTO W/O SCOPE: CPT | Performed by: PHYSICIAN ASSISTANT

## 2024-01-10 PROCEDURE — C9113 INJ PANTOPRAZOLE SODIUM, VIA: HCPCS | Performed by: PHYSICIAN ASSISTANT

## 2024-01-10 PROCEDURE — 96365 THER/PROPH/DIAG IV INF INIT: CPT

## 2024-01-10 PROCEDURE — 96375 TX/PRO/DX INJ NEW DRUG ADDON: CPT

## 2024-01-10 PROCEDURE — 96361 HYDRATE IV INFUSION ADD-ON: CPT

## 2024-01-10 RX ORDER — METRONIDAZOLE 500 MG/100ML
500 INJECTION, SOLUTION INTRAVENOUS ONCE
Status: DISCONTINUED | OUTPATIENT
Start: 2024-01-10 | End: 2024-01-10

## 2024-01-10 RX ORDER — SODIUM CHLORIDE 9 MG/ML
125 INJECTION, SOLUTION INTRAVENOUS CONTINUOUS
Status: DISCONTINUED | OUTPATIENT
Start: 2024-01-10 | End: 2024-01-11

## 2024-01-10 RX ORDER — ONDANSETRON 2 MG/ML
4 INJECTION INTRAMUSCULAR; INTRAVENOUS ONCE
Status: COMPLETED | OUTPATIENT
Start: 2024-01-10 | End: 2024-01-10

## 2024-01-10 RX ORDER — INSULIN LISPRO 100 [IU]/ML
1-6 INJECTION, SOLUTION INTRAVENOUS; SUBCUTANEOUS
Status: DISCONTINUED | OUTPATIENT
Start: 2024-01-10 | End: 2024-01-14 | Stop reason: HOSPADM

## 2024-01-10 RX ORDER — CIPROFLOXACIN 2 MG/ML
400 INJECTION, SOLUTION INTRAVENOUS EVERY 12 HOURS
Status: DISCONTINUED | OUTPATIENT
Start: 2024-01-11 | End: 2024-01-14 | Stop reason: HOSPADM

## 2024-01-10 RX ORDER — MORPHINE SULFATE 4 MG/ML
4 INJECTION, SOLUTION INTRAMUSCULAR; INTRAVENOUS ONCE
Status: COMPLETED | OUTPATIENT
Start: 2024-01-10 | End: 2024-01-10

## 2024-01-10 RX ORDER — ONDANSETRON 2 MG/ML
4 INJECTION INTRAMUSCULAR; INTRAVENOUS EVERY 6 HOURS PRN
Status: DISCONTINUED | OUTPATIENT
Start: 2024-01-10 | End: 2024-01-14 | Stop reason: HOSPADM

## 2024-01-10 RX ORDER — ENOXAPARIN SODIUM 100 MG/ML
40 INJECTION SUBCUTANEOUS DAILY
Status: DISCONTINUED | OUTPATIENT
Start: 2024-01-11 | End: 2024-01-14 | Stop reason: HOSPADM

## 2024-01-10 RX ORDER — OXYCODONE HYDROCHLORIDE 5 MG/1
5 TABLET ORAL EVERY 6 HOURS PRN
Status: DISCONTINUED | OUTPATIENT
Start: 2024-01-10 | End: 2024-01-14

## 2024-01-10 RX ORDER — METRONIDAZOLE 500 MG/100ML
500 INJECTION, SOLUTION INTRAVENOUS EVERY 8 HOURS
Status: DISCONTINUED | OUTPATIENT
Start: 2024-01-10 | End: 2024-01-14 | Stop reason: HOSPADM

## 2024-01-10 RX ORDER — PANTOPRAZOLE SODIUM 40 MG/10ML
40 INJECTION, POWDER, LYOPHILIZED, FOR SOLUTION INTRAVENOUS ONCE
Status: COMPLETED | OUTPATIENT
Start: 2024-01-10 | End: 2024-01-10

## 2024-01-10 RX ORDER — LOSARTAN POTASSIUM 25 MG/1
25 TABLET ORAL DAILY
Status: DISCONTINUED | OUTPATIENT
Start: 2024-01-11 | End: 2024-01-12

## 2024-01-10 RX ORDER — DULOXETIN HYDROCHLORIDE 60 MG/1
60 CAPSULE, DELAYED RELEASE ORAL DAILY
Status: DISCONTINUED | OUTPATIENT
Start: 2024-01-11 | End: 2024-01-14 | Stop reason: HOSPADM

## 2024-01-10 RX ORDER — ACETAMINOPHEN 325 MG/1
650 TABLET ORAL EVERY 6 HOURS PRN
Status: DISCONTINUED | OUTPATIENT
Start: 2024-01-10 | End: 2024-01-14 | Stop reason: HOSPADM

## 2024-01-10 RX ORDER — CIPROFLOXACIN 2 MG/ML
400 INJECTION, SOLUTION INTRAVENOUS ONCE
Status: COMPLETED | OUTPATIENT
Start: 2024-01-10 | End: 2024-01-10

## 2024-01-10 RX ADMIN — PANTOPRAZOLE SODIUM 40 MG: 40 INJECTION, POWDER, FOR SOLUTION INTRAVENOUS at 12:56

## 2024-01-10 RX ADMIN — METRONIDAZOLE 500 MG: 500 INJECTION, SOLUTION INTRAVENOUS at 17:16

## 2024-01-10 RX ADMIN — SODIUM CHLORIDE 500 ML: 0.9 INJECTION, SOLUTION INTRAVENOUS at 12:54

## 2024-01-10 RX ADMIN — SODIUM CHLORIDE 125 ML/HR: 0.9 INJECTION, SOLUTION INTRAVENOUS at 21:38

## 2024-01-10 RX ADMIN — MORPHINE SULFATE 2 MG: 2 INJECTION, SOLUTION INTRAMUSCULAR; INTRAVENOUS at 21:37

## 2024-01-10 RX ADMIN — IOHEXOL 100 ML: 350 INJECTION, SOLUTION INTRAVENOUS at 13:48

## 2024-01-10 RX ADMIN — SODIUM CHLORIDE 125 ML/HR: 0.9 INJECTION, SOLUTION INTRAVENOUS at 16:26

## 2024-01-10 RX ADMIN — CIPROFLOXACIN 400 MG: 2 INJECTION, SOLUTION INTRAVENOUS at 15:33

## 2024-01-10 RX ADMIN — MORPHINE SULFATE 4 MG: 4 INJECTION INTRAVENOUS at 15:31

## 2024-01-10 RX ADMIN — ONDANSETRON 4 MG: 2 INJECTION INTRAMUSCULAR; INTRAVENOUS at 15:29

## 2024-01-10 RX ADMIN — ONDANSETRON 4 MG: 2 INJECTION INTRAMUSCULAR; INTRAVENOUS at 21:37

## 2024-01-10 RX ADMIN — ONDANSETRON 4 MG: 2 INJECTION INTRAMUSCULAR; INTRAVENOUS at 12:52

## 2024-01-10 RX ADMIN — METRONIDAZOLE 500 MG: 500 INJECTION, SOLUTION INTRAVENOUS at 23:45

## 2024-01-10 NOTE — ED PROVIDER NOTES
History  Chief Complaint   Patient presents with    Abdominal Pain     Pt c/o upper abdominal pain w/nausea and vomiting starting yesterday. Pt mentioned being seen in this ED 2 days ago dx diverticulitis currently taking abx and pain meds but says pain is different. Denies travel/sob/fevers/cough     Patient is a 78-year-old female who presents with a complaint of upper abdominal pain with nausea and vomiting.  The patient was diagnosed with diverticulitis 2 days ago with CAT scan.  Patient was placed on Augmentin and oxycodone.  Patient states that her pain in her lower abdomen has improved but she is having pain in the upper abdomen now with nausea vomiting.  Has had no bowel movement over the last few days.  Has been taking antibiotics as prescribed but having a difficult time keeping them down.      Abdominal Pain  Pain location:  Epigastric and LLQ  Pain quality: cramping    Pain radiates to:  Does not radiate  Duration:  4 days  Timing:  Constant  Progression:  Unchanged  Chronicity:  New  Relieved by:  Nothing  Worsened by:  Eating  Ineffective treatments:  Belching, movement, not moving, position changes and vomiting  Associated symptoms: constipation and vomiting    Associated symptoms: no belching, no diarrhea, no dysuria, no fever and no shortness of breath    Vomiting:     Duration:  2 days    Timing:  Constant    Progression:  Worsening      Prior to Admission Medications   Prescriptions Last Dose Informant Patient Reported? Taking?   Aspirin Buf,CaCarb-MgCarb-MgO, 81 MG TABS   Yes No   Sig: Take 81 mg by mouth   Coenzyme Q10 (Co Q10 Maximum Strength) 200 MG capsule   Yes No   Sig: Take by mouth   DULoxetine (CYMBALTA) 60 mg delayed release capsule   Yes No   Sig: Take 120 mg by mouth   amoxicillin-clavulanate (AUGMENTIN) 875-125 mg per tablet   No No   Sig: Take 1 tablet by mouth every 12 (twelve) hours for 7 days   calcium citrate-vitamin D (Calcium + D) 315-200 MG-UNIT per tablet   Yes No   Sig:  Take by mouth   hydrochlorothiazide (HYDRODIURIL) 25 mg tablet   Yes No   Sig: Take 25 mg by mouth   insulin glargine (Lantus SoloStar) 100 units/mL injection pen   Yes No   Sig: Inject 50 Units under the skin daily in the early morning   losartan (COZAAR) 50 mg tablet   Yes No   Sig: Take 25 mg by mouth   metFORMIN (GLUCOPHAGE) 850 mg tablet   Yes No   Sig: Take 850 mg by mouth 2 (two) times a day with meals   omeprazole (PriLOSEC) 10 mg delayed release capsule   Yes No   Sig: Take by mouth   oxyCODONE (Roxicodone) 5 immediate release tablet   No No   Sig: Take 1 tablet (5 mg total) by mouth every 6 (six) hours as needed for severe pain for up to 15 doses Max Daily Amount: 20 mg   rosuvastatin (CRESTOR) 40 MG tablet   Yes No   sitaGLIPtin (Januvia) 100 mg tablet   Yes No      Facility-Administered Medications: None       Past Medical History:   Diagnosis Date    Coronary artery disease     Diabetes mellitus (HCC)     Hypertension     Lyme disease        Past Surgical History:   Procedure Laterality Date    CORONARY ARTERY BYPASS GRAFT      x3    FRACTURE SURGERY Left     knee    HYSTERECTOMY         Family History   Problem Relation Age of Onset    Hypertension Father      I have reviewed and agree with the history as documented.    E-Cigarette/Vaping    E-Cigarette Use Never User      E-Cigarette/Vaping Substances     Social History     Tobacco Use    Smoking status: Never    Smokeless tobacco: Never   Vaping Use    Vaping status: Never Used   Substance Use Topics    Alcohol use: Not Currently    Drug use: Not Currently       Review of Systems   Constitutional:  Negative for fever.   Respiratory:  Negative for shortness of breath.    Gastrointestinal:  Positive for abdominal pain, constipation and vomiting. Negative for diarrhea.   Genitourinary:  Negative for dysuria.   All other systems reviewed and are negative.      Physical Exam  Physical Exam  Vitals and nursing note reviewed.   Constitutional:        General: She is in acute distress.      Appearance: She is well-developed.   HENT:      Head: Normocephalic and atraumatic.      Right Ear: External ear normal.      Left Ear: External ear normal.      Mouth/Throat:      Mouth: Mucous membranes are dry.      Pharynx: Uvula midline. No oropharyngeal exudate.   Eyes:      Extraocular Movements: Extraocular movements intact.      Pupils: Pupils are equal, round, and reactive to light.   Cardiovascular:      Rate and Rhythm: Normal rate and regular rhythm.      Heart sounds: Normal heart sounds. No murmur heard.  Pulmonary:      Effort: Pulmonary effort is normal. No respiratory distress.      Breath sounds: Normal breath sounds. No wheezing.   Abdominal:      General: Bowel sounds are normal.      Palpations: Abdomen is soft. There is no mass.      Tenderness: There is abdominal tenderness in the epigastric area and left lower quadrant. There is no rebound.      Hernia: No hernia is present.   Musculoskeletal:      Cervical back: Normal range of motion and neck supple.   Skin:     General: Skin is warm and dry.      Capillary Refill: Capillary refill takes less than 2 seconds.   Neurological:      Mental Status: She is alert and oriented to person, place, and time.      Coordination: Coordination normal.      Gait: Gait is intact.   Psychiatric:         Behavior: Behavior normal.         Vital Signs  ED Triage Vitals   Temperature Pulse Respirations Blood Pressure SpO2   01/10/24 1231 01/10/24 1231 01/10/24 1231 01/10/24 1231 01/10/24 1231   (!) 97.4 °F (36.3 °C) 60 18 126/75 98 %      Temp Source Heart Rate Source Patient Position - Orthostatic VS BP Location FiO2 (%)   01/10/24 1527 01/10/24 1231 01/10/24 1231 01/10/24 1231 --   Oral Monitor Sitting Right arm       Pain Score       01/10/24 1231       8           Vitals:    01/10/24 1231 01/10/24 1527   BP: 126/75 139/71   Pulse: 60 59   Patient Position - Orthostatic VS: Sitting          Visual Acuity      ED  Medications  Medications   DULoxetine (CYMBALTA) delayed release capsule 60 mg (has no administration in time range)   sodium chloride 0.9 % infusion (125 mL/hr Intravenous New Bag 1/10/24 1626)   acetaminophen (TYLENOL) tablet 650 mg (has no administration in time range)   ondansetron (ZOFRAN) injection 4 mg (has no administration in time range)   enoxaparin (LOVENOX) subcutaneous injection 40 mg (has no administration in time range)   insulin lispro (HumaLOG) 100 units/mL subcutaneous injection 1-6 Units (0 Units Subcutaneous Not Given 1/10/24 1633)   insulin lispro (HumaLOG) 100 units/mL subcutaneous injection 1-6 Units (has no administration in time range)   metroNIDAZOLE (FLAGYL) IVPB (premix) 500 mg 100 mL (500 mg Intravenous New Bag 1/10/24 1716)   ciprofloxacin (CIPRO) IVPB (premix in 5% dextrose) 400 mg 200 mL (has no administration in time range)   morphine injection 2 mg (has no administration in time range)   oxyCODONE (ROXICODONE) IR tablet 5 mg (has no administration in time range)   losartan (COZAAR) tablet 25 mg (has no administration in time range)   ondansetron (ZOFRAN) injection 4 mg (4 mg Intravenous Given 1/10/24 1252)   pantoprazole (PROTONIX) injection 40 mg (40 mg Intravenous Given 1/10/24 1256)   sodium chloride 0.9 % bolus 500 mL (0 mL Intravenous Stopped 1/10/24 1406)   iohexol (OMNIPAQUE) 350 MG/ML injection (MULTI-DOSE) 100 mL (100 mL Intravenous Given 1/10/24 1348)   morphine injection 4 mg (4 mg Intravenous Given 1/10/24 1531)   ondansetron (ZOFRAN) injection 4 mg (4 mg Intravenous Given 1/10/24 1529)   ciprofloxacin (CIPRO) IVPB (premix in 5% dextrose) 400 mg 200 mL (0 mg Intravenous Stopped 1/10/24 1716)       Diagnostic Studies  Results Reviewed       Procedure Component Value Units Date/Time    Fingerstick Glucose (POCT) [[id number]]  (Normal) Collected: 01/10/24 1631    Lab Status: Final result Updated: 01/10/24 1633     POC Glucose 74 mg/dl     UA w Reflex to Microscopic w Reflex  to Culture [[id number]]  (Abnormal) Collected: 01/10/24 1516    Lab Status: Final result Specimen: Urine, Clean Catch Updated: 01/10/24 1543     Color, UA Yellow     Clarity, UA Clear     Specific Gravity, UA 1.010     pH, UA 7.0     Leukocytes, UA Negative     Nitrite, UA Negative     Protein, UA Negative mg/dl      Glucose,  (1/2%) mg/dl      Ketones, UA Negative mg/dl      Urobilinogen, UA 1.0 E.U./dl      Bilirubin, UA Negative     Occult Blood, UA Negative    Procalcitonin [[id number]]  (Normal) Collected: 01/10/24 1259    Lab Status: Final result Specimen: Blood from Arm, Left Updated: 01/10/24 1349     Procalcitonin 0.06 ng/ml     FLU/RSV/COVID - if FLU/RSV clinically relevant [[id number]]  (Normal) Collected: 01/10/24 1259    Lab Status: Final result Specimen: Nares from Nose Updated: 01/10/24 1348     SARS-CoV-2 Negative     INFLUENZA A PCR Negative     INFLUENZA B PCR Negative     RSV PCR Negative    Narrative:      FOR PEDIATRIC PATIENTS - copy/paste COVID Guidelines URL to browser: [email or web address]    SARS-CoV-2 assay is a Nucleic Acid Amplification assay intended for the  qualitative detection of nucleic acid from SARS-CoV-2 in nasopharyngeal  swabs. Results are for the presumptive identification of SARS-CoV-2 RNA.    Positive results are indicative of infection with SARS-CoV-2, the virus  causing COVID-19, but do not rule out bacterial infection or co-infection  with other viruses. Laboratories within the [place] and its  territories are required to report all positive results to the appropriate  public health authorities. Negative results do not preclude SARS-CoV-2  infection and should not be used as the sole basis for treatment or other  patient management decisions. Negative results must be combined with  clinical observations, patient history, and epidemiological information.  This test has not been FDA cleared or approved.    This  test has been authorized by FDA under an Emergency Use Authorization  (EUA). This test is only authorized for the duration of time the  declaration that circumstances exist justifying the authorization of the  emergency use of an in vitro diagnostic tests for detection of SARS-CoV-2  virus and/or diagnosis of COVID-19 infection under section 564(b)(1) of  the Act, 21 U.S.C. 360bbb-3(b)(1), unless the authorization is terminated  or revoked sooner. The test has been validated but independent review by FDA  and CLIA is pending.    Test performed using [employer] GeneXpert: This RT-PCR assay targets N2,  a region unique to SARS-CoV-2. A conserved region in the E-gene was chosen  for pan-Sarbecovirus detection which includes SARS-CoV-2.    According to CMS-2020-01-R, this platform meets the definition of high-throughput technology.    Comprehensive metabolic panel [[id number]] Collected: 01/10/24 1259    Lab Status: Final result Specimen: Blood from Arm, Left Updated: 01/10/24 1328     Sodium 137 mmol/L      Potassium 4.0 mmol/L      Chloride 102 mmol/L      CO2 26 mmol/L      ANION GAP 9 mmol/L      BUN 21 mg/dL      Creatinine 1.02 mg/dL      Glucose 137 mg/dL      Calcium 10.0 mg/dL      AST 16 U/L      ALT 14 U/L      Alkaline Phosphatase 57 U/L      Total Protein 7.7 g/dL      Albumin 4.2 g/dL      Total Bilirubin 0.46 mg/dL      eGFR 53 ml/min/1.73sq m     Narrative:      National Kidney Disease Foundation guidelines for Chronic Kidney Disease (CKD):     Stage 1 with normal or high GFR (GFR > 90 mL/min/1.73 square meters)    Stage 2 Mild CKD (GFR = 60-89 mL/min/1.73 square meters)    Stage 3A Moderate CKD (GFR = 45-59 mL/min/1.73 square meters)    Stage 3B Moderate CKD (GFR = 30-44 mL/min/1.73 square meters)    Stage 4 Severe CKD (GFR = 15-29 mL/min/1.73 square meters)    Stage 5 End Stage CKD (GFR <15 mL/min/1.73 square meters)  Note: GFR calculation is accurate only with a steady state creatinine    Lactic acid,  plasma (w/reflex if result > 2.0) [[id number]]  (Normal) Collected: 01/10/24 1259    Lab Status: Final result Specimen: Blood from Arm, Left Updated: 01/10/24 1326     LACTIC ACID 1.5 mmol/L     Narrative:      Result may be elevated if tourniquet was used during collection.    Blood culture #1 [[id number]] Collected: 01/10/24 1316    Lab Status: In process Specimen: Blood from Hand, Left Updated: 01/10/24 1320    CBC and differential [[id number]]  (Abnormal) Collected: 01/10/24 1259    Lab Status: Final result Specimen: Blood from Arm, Left Updated: 01/10/24 1310     WBC 6.75 Thousand/uL      RBC 5.13 Million/uL      Hemoglobin 13.3 g/dL      Hematocrit 41.9 %      MCV 82 fL      MCH 25.9 pg      MCHC 31.7 g/dL      RDW 15.4 %      MPV 9.7 fL      Platelets 257 Thousands/uL      nRBC 0 /100 WBCs      Neutrophils Relative 61 %      Immat GRANS % 0 %      Lymphocytes Relative 30 %      Monocytes Relative 7 %      Eosinophils Relative 2 %      Basophils Relative 0 %      Neutrophils Absolute 4.03 Thousands/µL      Immature Grans Absolute 0.03 Thousand/uL      Lymphocytes Absolute 2.05 Thousands/µL      Monocytes Absolute 0.45 Thousand/µL      Eosinophils Absolute 0.16 Thousand/µL      Basophils Absolute 0.03 Thousands/µL     Blood culture #2 [[id number]] Collected: 01/10/24 1259    Lab Status: In process Specimen: Blood from Arm, Left Updated: 01/10/24 1307                   CT abdomen pelvis with contrast   Final Result by Harmeet Mcintosh MD (01/10 3835)      Acute diverticulitis of the distal descending colon in the left lower quadrant with possible 1.2 cm intramural abscess is not significantly changed since January 8, 2024.   The study was marked in EPIC for immediate notification.         Workstation performed: [id number]                    Procedures  Procedures         ED Course  ED Course as of 01/10/24 1757   Wed Armando 10, 2024   1521 Dr. Cordero with IR was sent patients information for recommendations    2726   Gil did not recommend any IR intervention    1626 Dr. Murphy with [facility] contacted and accepted admission                                SBIRT 20yo+      Flowsheet Row Most Recent Value   Initial Alcohol Screen: US AUDIT-C     1. How often do you have a drink containing alcohol? 0 Filed at: 01/10/2024 1537   2. How many drinks containing alcohol do you have on a typical day you are drinking?  0 Filed at: 01/10/2024 1537   3a. Male UNDER 65: How often do you have five or more drinks on one occasion? 0 Filed at: 01/10/2024 1537   3b. FEMALE Any Age, or MALE 65+: How often do you have 4 or more drinks on one occassion? 0 Filed at: 01/10/2024 1537   Audit-C Score 0 Filed at: 01/10/2024 1537   YARELY: How many times in the past year have you...    Used an illegal drug or used a prescription medication for non-medical reasons? Never Filed at: 01/10/2024 1537                      Medical Decision Making  Patient is a 78-year-old female who presents with a complaint of upper abdominal pain with nausea and vomiting.  The patient was diagnosed with diverticulitis 2 days ago with CAT scan.  Patient was placed on Augmentin and oxycodone.  Patient states that her pain in her lower abdomen has improved but she is having pain in the upper abdomen now with nausea vomiting.  Has had no bowel movement over the last few days.  Has been taking antibiotics as prescribed but having a difficult time keeping them down.    The patient's examination noted vomiting acute distress.  Patient had left lower quadrant pain and upper epigastric pain.  Patient's vomiting was controlled in the emergency department.  Was given pain control.  Lab work overall was not significantly abnormal but the patient's CAT scan noted to have diverticulitis with abscess no worse than previous imaging but no better.  The patient was unable to tolerate the Augmentin.  Was discussed with hospitalist service for admission and accepted under Dr. Santoro service due to failure  of outpatient treatment and inability to tolerate p.o. antibiotics at this time.        Differential diagnosis was included but not limited to: GERD, gastritis, PUD, esophageal spasm, pancreatitis, acute cholecystitis, acute cholangitis, biliary colic, acute cystitis, renal colic, kidney stone, MSK pain, urinary retention, colitis,ovarian torsion, ovarian abscess, diverticulitis, constipation, proctitis, small bowel obstruction, large bowel obstruction, mass, viral syndrome      Amount and/or Complexity of Data Reviewed  External Data Reviewed: labs.  Labs: ordered. Decision-making details documented in ED Course.  Radiology: ordered and independent interpretation performed. Decision-making details documented in ED Course.  Discussion of management or test interpretation with external provider(s): Dr. Jeffrey Daily  Prescription drug management.  Decision regarding hospitalization.             Disposition  Final diagnoses:   Nausea and vomiting   Diverticulitis of large intestine with abscess without bleeding     Time reflects when diagnosis was documented in both MDM as applicable and the Disposition within this note       Time User Action Codes Description Comment    1/10/2024  3:14 PM Keith Alston [R11.2] Nausea and vomiting     1/10/2024  3:14 PM Keith Alston [K57.20] Diverticulitis of large intestine with abscess without bleeding           ED Disposition       ED Disposition   Admit    Condition   Stable    Date/Time   Wed Armando 10, 2024 1518    Comment   Case was discussed with jeffrey  and the patient's admission status was agreed to be Admission Status: inpatient status to the service of Dr. booth .               Follow-up Information    None         Patient's Medications   Discharge Prescriptions    No medications on file       No discharge procedures on file.    PDMP Review       None            ED Provider  Electronically Signed by             Keith Alston PA-C  01/10/24 5804

## 2024-01-10 NOTE — ASSESSMENT & PLAN NOTE
Started having abdominal pain nausea and constipation 4 days ago initially was in the ED was told that she has a diverticulitis was discharged on antibiotics however after she went home she felt that sharp pain was still the same and she was not feeling better and returned back to the ED today.  CT abdomen pelvis today shows  Acute diverticulitis of the distal descending colon in the left lower quadrant with possible 1.2 cm intramural abscess     Patient was recently prescribed Augmentin however she did not really take that much to consider it failure yet.  Will place on Cipro and Flagyl for now IV and placed on clear liquid diet and see how she does.  Discussed with patient that she will eventually need a colonoscopy to be done in 4 to 6 weeks.  This is the patient's first bout of diverticulitis.  Last colonoscopy was over 5 years ago

## 2024-01-10 NOTE — H&P
"[facility]  H&P  Name: Radha May 78 y.o. female I MRN: [id number]  Unit/Bed#: Z1 H4 I Date of Admission: 1/10/2024   Date of Service: 1/10/2024 I Hospital Day: 0      Assessment/Plan   * Acute diverticulitis  Assessment & Plan  Started having abdominal pain nausea and constipation 4 days ago initially was in the ED was told that she has a diverticulitis was discharged on antibiotics however after she went home she felt that sharp pain was still the same and she was not feeling better and returned back to the ED today.  CT abdomen pelvis today shows  Acute diverticulitis of the distal descending colon in the left lower quadrant with possible 1.2 cm intramural abscess     Patient was recently prescribed Augmentin however she did not really take that much to consider it failure yet.  Will place on Cipro and Flagyl for now IV and placed on clear liquid diet and see how she does.  Discussed with patient that she will eventually need a colonoscopy to be done in 4 to 6 weeks.  This is the patient's first bout of diverticulitis.  Last colonoscopy was over 5 years ago    Type 2 diabetes mellitus with hypoglycemia without coma, with long-term current use of insulin (HCC)  Assessment & Plan  Lab Results   Component Value Date    HGBA1C 10.0 (H) 07/14/2023       No results for input(s): \"POCGLU\" in the last 72 hours.    Blood Sugar Average: Last 72 hrs:  Patient took Lantus 50 units this morning as per her usual regimen and also takes oral medications including metformin and Januvia.  Hold meds for now will place on clear liquid diabetic diet for now and insulin sliding scale alone and monitor how she does and gradually add insulin based on her blood sugars      Hyperlipidemia, mixed  Assessment & Plan  Hold statins while patient's oral intake is poor    Essential hypertension  Assessment & Plan  Blood pressures are controlled will hold HCTZ and losartan tonight and placed on IV fluid hydration " and monitor.  Will restart meds from tomorrow         VTE Prophylaxis: Enoxaparin (Lovenox)  / sequential compression device   Code Status: Full code  POLST: There is no POLST form on file for this patient (pre-hospital)  Discussion with family: None    Anticipated Length of Stay:  Patient will be admitted on an Inpatient basis with an anticipated length of stay of more than 2 midnights.   Justification for Hospital Stay: Acute diverticulitis with intramural abscess    Total Time for Visit, including Counseling / Coordination of Care: 90 minutes.  Greater than 50% of this total time spent on direct patient counseling and coordination of care.    Chief Complaint:   Abdominal pain    History of Present Illness:    Radha May is a 78 y.o. female who presents with abdominal pain that started 3 to 5 days ago.  Patient states that she is never had a bout of diverticulitis.  She came to the ED was prescribed antibiotics she has not had a bowel movement for 4 days and continues to have left lower quadrant abdominal pain which is 6 to 7 x 10 intensity crampy pain.  Also having nausea.  Returned back to the emergency room today as she was not feeling any better despite taking oral Augmentin as prescribed.    Review of Systems:    Review of Systems   Constitutional:  Positive for appetite change and fatigue. Negative for chills and fever.   HENT:  Negative for hearing loss, sore throat and trouble swallowing.    Eyes:  Negative for photophobia, discharge and visual disturbance.   Respiratory:  Negative for chest tightness and shortness of breath.    Cardiovascular:  Negative for chest pain and palpitations.   Gastrointestinal:  Positive for abdominal pain, constipation and nausea. Negative for blood in stool and vomiting.   Endocrine: Negative for polydipsia and polyuria.   Genitourinary:  Negative for difficulty urinating, dysuria, flank pain and hematuria.   Musculoskeletal:  Negative for back pain and gait problem.    Skin:  Negative for rash.   Allergic/Immunologic: Negative for environmental allergies and food allergies.   Neurological:  Negative for dizziness, seizures, syncope and headaches.   Hematological:  Does not bruise/bleed easily.   Psychiatric/Behavioral:  Negative for behavioral problems.    All other systems reviewed and are negative.      Past Medical and Surgical History:     Past Medical History:   Diagnosis Date    Coronary artery disease     Diabetes mellitus (HCC)     Hypertension     Lyme disease        Past Surgical History:   Procedure Laterality Date    CORONARY ARTERY BYPASS GRAFT      x3    FRACTURE SURGERY Left     knee    HYSTERECTOMY         Meds/Allergies:    Prior to Admission medications    Medication Sig Start Date End Date Taking? Authorizing Provider   amoxicillin-clavulanate (AUGMENTIN) 875-125 mg per tablet Take 1 tablet by mouth every 12 (twelve) hours for 7 days 1/8/24 1/15/24  Helder Angeles, DO   Aspirin Buf,CaCarb-MgCarb-MgO, 81 MG TABS Take 81 mg by mouth    Historical Provider, MD   calcium citrate-vitamin D (Calcium + D) 315-200 MG-UNIT per tablet Take by mouth    Historical Provider, MD   Coenzyme Q10 (Co Q10 Maximum Strength) 200 MG capsule Take by mouth    Historical Provider, MD   DULoxetine (CYMBALTA) 60 mg delayed release capsule Take 120 mg by mouth    Historical Provider, MD   hydrochlorothiazide (HYDRODIURIL) 25 mg tablet Take 25 mg by mouth 4/28/17   Historical Provider, MD   insulin glargine (Lantus SoloStar) 100 units/mL injection pen Inject 50 Units under the skin daily in the early morning    Historical Provider, MD   losartan (COZAAR) 50 mg tablet Take 25 mg by mouth 7/6/20   Historical Provider, MD   metFORMIN (GLUCOPHAGE) 850 mg tablet Take 850 mg by mouth 2 (two) times a day with meals    Historical Provider, MD   omeprazole (PriLOSEC) 10 mg delayed release capsule Take by mouth    Historical Provider, MD   oxyCODONE (Roxicodone) 5 immediate release tablet Take 1  "tablet (5 mg total) by mouth every 6 (six) hours as needed for severe pain for up to 15 doses Max Daily Amount: 20 mg 1/8/24   Helder Angeles DO   rosuvastatin (CRESTOR) 40 MG tablet  10/24/18   Historical Provider, MD   sitaGLIPtin (Januvia) 100 mg tablet  1/31/19   Historical Provider, MD     I have reviewed home medications with patient personally.    Allergies:   Allergies   Allergen Reactions    Atorvastatin Myalgia and Other (See Comments)     Sore muscles      Other reaction(s): sore muscles    Sore muscles    Cat Hair Extract Blisters    Lactose - Food Allergy GI Intolerance    Toprol Xl [Metoprolol] Hallucinations    Ranolazine Fatigue     Other reaction(s): fatigue       Social History:     Marital Status:      Social History     Substance and Sexual Activity   Alcohol Use Not Currently     Social History     Tobacco Use   Smoking Status Never   Smokeless Tobacco Never     Social History     Substance and Sexual Activity   Drug Use Not Currently       Family History:    Family History   Problem Relation Age of Onset    Hypertension Father        Physical Exam:     Vitals:   Blood Pressure: 139/71 (01/10/24 1527)  Pulse: 59 (01/10/24 1527)  Temperature: 98.1 °F (36.7 °C) (01/10/24 1527)  Temp Source: Oral (01/10/24 1527)  Respirations: 18 (01/10/24 1527)  Height: 5' 1\" (154.9 cm) (01/10/24 1231)  Weight - Scale: 85.7 kg (189 lb) (01/10/24 1231)  SpO2: 98 % (01/10/24 1527)    Physical Exam  Vitals and nursing note reviewed.   Constitutional:       Appearance: She is ill-appearing.   HENT:      Head: Normocephalic and atraumatic.      Right Ear: External ear normal.      Left Ear: External ear normal.      Nose: Nose normal.      Mouth/Throat:      Pharynx: Oropharynx is clear.   Eyes:      Pupils: Pupils are equal, round, and reactive to light.   Cardiovascular:      Rate and Rhythm: Normal rate and regular rhythm.      Heart sounds: Normal heart sounds.   Pulmonary:      Effort: Pulmonary effort " is normal.      Breath sounds: Normal breath sounds.   Abdominal:      General: Bowel sounds are normal.      Palpations: Abdomen is soft.      Tenderness: There is abdominal tenderness. There is guarding.   Musculoskeletal:         General: Normal range of motion.      Cervical back: Normal range of motion and neck supple.   Skin:     General: Skin is warm and dry.      Capillary Refill: Capillary refill takes less than 2 seconds.   Neurological:      General: No focal deficit present.      Mental Status: She is alert and oriented to person, place, and time.   Psychiatric:         Mood and Affect: Mood normal.           Additional Data:     Lab Results: I have personally reviewed pertinent reports.      Results from last 7 days   Lab Units 01/10/24  1259   WBC Thousand/uL 6.75   HEMOGLOBIN g/dL 13.3   HEMATOCRIT % 41.9   PLATELETS Thousands/uL 257   NEUTROS PCT % 61   LYMPHS PCT % 30   MONOS PCT % 7   EOS PCT % 2     Results from last 7 days   Lab Units 01/10/24  1259   SODIUM mmol/L 137   POTASSIUM mmol/L 4.0   CHLORIDE mmol/L 102   CO2 mmol/L 26   BUN mg/dL 21   CREATININE mg/dL 1.02   ANION GAP mmol/L 9   CALCIUM mg/dL 10.0   ALBUMIN g/dL 4.2   TOTAL BILIRUBIN mg/dL 0.46   ALK PHOS U/L 57   ALT U/L 14   AST U/L 16   GLUCOSE RANDOM mg/dL 137                 Results from last 7 days   Lab Units 01/10/24  1259 01/08/24  1420   LACTIC ACID mmol/L 1.5 1.0   PROCALCITONIN ng/ml 0.06  --        Imaging: I have personally reviewed pertinent reports.      CT abdomen pelvis with contrast   Final Result by Harmeet Mcintosh MD (01/10 1425)      Acute diverticulitis of the distal descending colon in the left lower quadrant with possible 1.2 cm intramural abscess is not significantly changed since January 8, 2024.   The study was marked in EPIC for immediate notification.         Workstation performed: [id number]             EKG, Pathology, and Other Studies Reviewed on Admission:   EKG: Sinus rhythm    Allscripts / Epic  Records Reviewed: Yes     ** Please Note: This note has been constructed using a voice recognition system. **

## 2024-01-10 NOTE — ASSESSMENT & PLAN NOTE
"Lab Results   Component Value Date    HGBA1C 10.0 (H) 07/14/2023       No results for input(s): \"POCGLU\" in the last 72 hours.    Blood Sugar Average: Last 72 hrs:  Patient took Lantus 50 units this morning as per her usual regimen and also takes oral medications including metformin and Januvia.  Hold meds for now will place on clear liquid diabetic diet for now and insulin sliding scale alone and monitor how she does and gradually add insulin based on her blood sugars    "

## 2024-01-10 NOTE — ASSESSMENT & PLAN NOTE
Blood pressures are controlled will hold HCTZ and losartan tonight and placed on IV fluid hydration and monitor.  Will restart meds from tomorrow

## 2024-01-11 LAB
ALBUMIN SERPL BCP-MCNC: 3.6 G/DL (ref 3.5–5)
ALP SERPL-CCNC: 47 U/L (ref 34–104)
ALT SERPL W P-5'-P-CCNC: 12 U/L (ref 7–52)
ANION GAP SERPL CALCULATED.3IONS-SCNC: 9 MMOL/L
AST SERPL W P-5'-P-CCNC: 17 U/L (ref 13–39)
BILIRUB SERPL-MCNC: 0.25 MG/DL (ref 0.2–1)
BUN SERPL-MCNC: 15 MG/DL (ref 5–25)
CALCIUM SERPL-MCNC: 9 MG/DL (ref 8.4–10.2)
CHLORIDE SERPL-SCNC: 106 MMOL/L (ref 96–108)
CO2 SERPL-SCNC: 25 MMOL/L (ref 21–32)
CREAT SERPL-MCNC: 1.01 MG/DL (ref 0.6–1.3)
ERYTHROCYTE [DISTWIDTH] IN BLOOD BY AUTOMATED COUNT: 15.4 % (ref 11.6–15.1)
GFR SERPL CREATININE-BSD FRML MDRD: 54 ML/MIN/1.73SQ M
GLUCOSE SERPL-MCNC: 106 MG/DL (ref 65–140)
GLUCOSE SERPL-MCNC: 119 MG/DL (ref 65–140)
GLUCOSE SERPL-MCNC: 61 MG/DL (ref 65–140)
GLUCOSE SERPL-MCNC: 67 MG/DL (ref 65–140)
GLUCOSE SERPL-MCNC: 94 MG/DL (ref 65–140)
GLUCOSE SERPL-MCNC: 98 MG/DL (ref 65–140)
HCT VFR BLD AUTO: 41.3 % (ref 34.8–46.1)
HGB BLD-MCNC: 12.6 G/DL (ref 11.5–15.4)
MCH RBC QN AUTO: 25.8 PG (ref 26.8–34.3)
MCHC RBC AUTO-ENTMCNC: 30.5 G/DL (ref 31.4–37.4)
MCV RBC AUTO: 85 FL (ref 82–98)
PLATELET # BLD AUTO: 221 THOUSANDS/UL (ref 149–390)
PMV BLD AUTO: 9.7 FL (ref 8.9–12.7)
POTASSIUM SERPL-SCNC: 3.8 MMOL/L (ref 3.5–5.3)
PROT SERPL-MCNC: 6.4 G/DL (ref 6.4–8.4)
RBC # BLD AUTO: 4.89 MILLION/UL (ref 3.81–5.12)
SODIUM SERPL-SCNC: 140 MMOL/L (ref 135–147)
TSH SERPL DL<=0.05 MIU/L-ACNC: 2.9 UIU/ML (ref 0.45–4.5)
WBC # BLD AUTO: 5.3 THOUSAND/UL (ref 4.31–10.16)

## 2024-01-11 PROCEDURE — 82948 REAGENT STRIP/BLOOD GLUCOSE: CPT

## 2024-01-11 PROCEDURE — 80053 COMPREHEN METABOLIC PANEL: CPT | Performed by: FAMILY MEDICINE

## 2024-01-11 PROCEDURE — 99232 SBSQ HOSP IP/OBS MODERATE 35: CPT | Performed by: FAMILY MEDICINE

## 2024-01-11 PROCEDURE — 84443 ASSAY THYROID STIM HORMONE: CPT | Performed by: FAMILY MEDICINE

## 2024-01-11 PROCEDURE — 85027 COMPLETE CBC AUTOMATED: CPT | Performed by: FAMILY MEDICINE

## 2024-01-11 RX ORDER — BISACODYL 10 MG
10 SUPPOSITORY, RECTAL RECTAL DAILY
Status: DISCONTINUED | OUTPATIENT
Start: 2024-01-11 | End: 2024-01-14 | Stop reason: HOSPADM

## 2024-01-11 RX ORDER — HYDROXYZINE HYDROCHLORIDE 25 MG/1
25 TABLET, FILM COATED ORAL EVERY 6 HOURS PRN
Status: DISCONTINUED | OUTPATIENT
Start: 2024-01-11 | End: 2024-01-14 | Stop reason: HOSPADM

## 2024-01-11 RX ADMIN — HYDROXYZINE HYDROCHLORIDE 25 MG: 25 TABLET ORAL at 13:33

## 2024-01-11 RX ADMIN — LOSARTAN POTASSIUM 25 MG: 25 TABLET, FILM COATED ORAL at 08:08

## 2024-01-11 RX ADMIN — ENOXAPARIN SODIUM 40 MG: 40 INJECTION SUBCUTANEOUS at 08:08

## 2024-01-11 RX ADMIN — OXYCODONE HYDROCHLORIDE 5 MG: 5 TABLET ORAL at 08:08

## 2024-01-11 RX ADMIN — ONDANSETRON 4 MG: 2 INJECTION INTRAMUSCULAR; INTRAVENOUS at 08:07

## 2024-01-11 RX ADMIN — CIPROFLOXACIN 400 MG: 2 INJECTION, SOLUTION INTRAVENOUS at 16:40

## 2024-01-11 RX ADMIN — METRONIDAZOLE 500 MG: 500 INJECTION, SOLUTION INTRAVENOUS at 16:41

## 2024-01-11 RX ADMIN — CIPROFLOXACIN 400 MG: 2 INJECTION, SOLUTION INTRAVENOUS at 02:19

## 2024-01-11 RX ADMIN — DULOXETINE HYDROCHLORIDE 60 MG: 60 CAPSULE, DELAYED RELEASE ORAL at 08:08

## 2024-01-11 RX ADMIN — BISACODYL 10 MG: 10 SUPPOSITORY RECTAL at 18:51

## 2024-01-11 RX ADMIN — METRONIDAZOLE 500 MG: 500 INJECTION, SOLUTION INTRAVENOUS at 08:09

## 2024-01-11 RX ADMIN — SODIUM CHLORIDE 125 ML/HR: 0.9 INJECTION, SOLUTION INTRAVENOUS at 06:02

## 2024-01-11 RX ADMIN — OXYCODONE HYDROCHLORIDE 5 MG: 5 TABLET ORAL at 21:57

## 2024-01-11 NOTE — PLAN OF CARE
Problem: PAIN - ADULT  Goal: Verbalizes/displays adequate comfort level or baseline comfort level  Description: Interventions:  - Encourage patient to monitor pain and request assistance  - Assess pain using appropriate pain scale  - Administer analgesics based on type and severity of pain and evaluate response  - Implement non-pharmacological measures as appropriate and evaluate response  - Consider cultural and social influences on pain and pain management  - Notify physician/advanced practitioner if interventions unsuccessful or patient reports new pain  Outcome: Progressing     Problem: INFECTION - ADULT  Goal: Absence or prevention of progression during hospitalization  Description: INTERVENTIONS:  - Assess and monitor for signs and symptoms of infection  - Monitor lab/diagnostic results  - Monitor all insertion sites, i.e. indwelling lines, tubes, and drains  - Monitor endotracheal if appropriate and nasal secretions for changes in amount and color  - [place] appropriate cooling/warming therapies per order  - Administer medications as ordered  - Instruct and encourage patient and family to use good hand hygiene technique  - Identify and instruct in appropriate isolation precautions for identified infection/condition  Outcome: Progressing  Goal: Absence of fever/infection during neutropenic period  Description: INTERVENTIONS:  - Monitor WBC    Outcome: Progressing     Problem: SAFETY ADULT  Goal: Patient will remain free of falls  Description: INTERVENTIONS:  - Educate patient/family on patient safety including physical limitations  - Instruct patient to call for assistance with activity   - Consult OT/PT to assist with strengthening/mobility   - Keep Call bell within reach  - Keep bed low and locked with side rails adjusted as appropriate  - Keep care items and personal belongings within reach  - Initiate and maintain comfort rounds  - Make Fall Risk Sign visible to staff  - Apply yellow socks and bracelet  for high fall risk patients  - Consider moving patient to room near nurses station  Outcome: Progressing  Goal: Maintain or return to baseline ADL function  Description: INTERVENTIONS:  -  Assess patient's ability to carry out ADLs; assess patient's baseline for ADL function and identify physical deficits which impact ability to perform ADLs (bathing, care of mouth/teeth, toileting, grooming, dressing, etc.)  - Assess/evaluate cause of self-care deficits   - Assess range of motion  - Assess patient's mobility; develop plan if impaired  - Assess patient's need for assistive devices and provide as appropriate  - Encourage maximum independence but intervene and supervise when necessary  - Involve family in performance of ADLs  - Assess for home care needs following discharge   - Consider OT consult to assist with ADL evaluation and planning for discharge  - Provide patient education as appropriate  Outcome: Progressing  Goal: Maintains/Returns to pre admission functional level  Description: INTERVENTIONS:  - Perform AM-PAC 6 Click Basic Mobility/ Daily Activity assessment daily.  - Set and communicate daily mobility goal to care team and patient/family/caregiver.   - Collaborate with rehabilitation services on mobility goals if consulted  - Perform Range of Motion 3 times a day.  - Reposition patient every 2 hours.  - Dangle patient 3 times a day  - Stand patient 3 times a day  - Ambulate patient 3 times a day  - Out of bed to chair 3 times a day   - Out of bed for meals 3 times a day  - Out of bed for toileting  - Record patient progress and toleration of activity level   Outcome: Progressing     Problem: DISCHARGE PLANNING  Goal: Discharge to home or other facility with appropriate resources  Description: INTERVENTIONS:  - Identify barriers to discharge w/patient and caregiver  - Arrange for needed discharge resources and transportation as appropriate  - Identify discharge learning needs (meds, wound care, etc.)  -  Arrange for interpretive services to assist at discharge as needed  - Refer to Case Management Department for coordinating discharge planning if the patient needs post-hospital services based on physician/advanced practitioner order or complex needs related to functional status, cognitive ability, or social support system  Outcome: Progressing     Problem: Knowledge Deficit  Goal: Patient/family/caregiver demonstrates understanding of disease process, treatment plan, medications, and discharge instructions  Description: Complete learning assessment and assess knowledge base.  Interventions:  - Provide teaching at level of understanding  - Provide teaching via preferred learning methods  Outcome: Progressing

## 2024-01-11 NOTE — ED NOTES
Pt ambulated independently for BRPs; no gait disturbance noted.      Becca Goldberg RN  01/10/24 2002

## 2024-01-11 NOTE — NURSING NOTE
For blood sugar of 61 given juice and jello (clears diet) recheck blood sugar 106 denies symptoms of hypoglycemia  notified Dr. Murphy

## 2024-01-11 NOTE — ASSESSMENT & PLAN NOTE
Started having abdominal pain nausea and constipation 4 days ago initially was in the ED was told that she has a diverticulitis was discharged on antibiotics however after she went home she felt that sharp pain was still the same and she was not feeling better and returned back to the ED today.  CT abdomen pelvis today shows  Acute diverticulitis of the distal descending colon in the left lower quadrant with possible 1.2 cm intramural abscess     Patient was recently prescribed Augmentin however she did not really take that much to consider it failure yet.  Will place on Cipro and Flagyl for now IV and placed full liquid diet toast crx and see how she does.  Discussed with patient that she will eventually need a colonoscopy to be done in 4 to 6 weeks.  This is the patient's first bout of diverticulitis.  Last colonoscopy was over 5 years ago

## 2024-01-11 NOTE — PROGRESS NOTES
[facility]  Progress Note  Name: Radha May I  MRN: [id number]  Unit/Bed#: -01 I Date of Admission: 1/10/2024   Date of Service: 1/11/2024 I Hospital Day: 1    Assessment/Plan   * Acute diverticulitis  Assessment & Plan  Started having abdominal pain nausea and constipation 4 days ago initially was in the ED was told that she has a diverticulitis was discharged on antibiotics however after she went home she felt that sharp pain was still the same and she was not feeling better and returned back to the ED today.  CT abdomen pelvis today shows  Acute diverticulitis of the distal descending colon in the left lower quadrant with possible 1.2 cm intramural abscess     Patient was recently prescribed Augmentin however she did not really take that much to consider it failure yet.  Will place on Cipro and Flagyl for now IV and placed full liquid diet toast crx and see how she does.  Discussed with patient that she will eventually need a colonoscopy to be done in 4 to 6 weeks.  This is the patient's first bout of diverticulitis.  Last colonoscopy was over 5 years ago    Type 2 diabetes mellitus with hypoglycemia without coma, with long-term current use of insulin (HCC)  Assessment & Plan  Lab Results   Component Value Date    HGBA1C 10.0 (H) 07/14/2023       Recent Labs     01/10/24  2117 01/11/24  0731 01/11/24  0816 01/11/24  1042   POCGLU 79 61* 106 98       Blood Sugar Average: Last 72 hrs:  (P) 83.6Patient took Lantus 50 units this morning as per her usual regimen and also takes oral medications including metformin and Januvia.  Hold meds for now will place on full liquid diabetic diet for now and insulin sliding scale alone and monitor how she does and gradually add insulin based on her blood sugars      Hyperlipidemia, mixed  Assessment & Plan  Hold statins while patient's oral intake is poor    Essential hypertension  Assessment & Plan  Blood pressures are controlled will hold  HCTZ and losartan tonight and placed on IV fluid hydration and monitor.  Will restart losartan and stop iv fluids               VTE Pharmacologic Prophylaxis:   Moderate Risk (Score 3-4) - Pharmacological DVT Prophylaxis Ordered: enoxaparin (Lovenox).    Mobility:   Basic Mobility Inpatient Raw Score: 19  JH-HLM Goal: 6: Walk 10 steps or more  JH-HLM Achieved: 6: Walk 10 steps or more  HLM Goal achieved. Continue to encourage appropriate mobility.    Patient Centered Rounds: I performed bedside rounds with nursing staff today.   Discussions with Specialists or Other Care Team Provider: none    Education and Discussions with Family / Patient: update family    Total Time Spent on Date of Encounter in care of patient: 35 mins. This time was spent on one or more of the following: performing physical exam; counseling and coordination of care; obtaining or reviewing history; documenting in the medical record; reviewing/ordering tests, medications or procedures; communicating with other healthcare professionals and discussing with patient's family/caregivers.    Current Length of Stay: 1 day(s)  Current Patient Status: Inpatient   Certification Statement: The patient will continue to require additional inpatient hospital stay due to acute diverticulitis with intramural abscess  Discharge Plan: Anticipate discharge tomorrow to home.    Code Status: Level 1 - Full Code    Subjective:   Abdominal pain feels a little bit better today tolerating clear liquids denies any nausea vomiting still no bowel movement    Objective:     Vitals:   Temp (24hrs), Av.8 °F (36.6 °C), Min:97.5 °F (36.4 °C), Max:98.1 °F (36.7 °C)    Temp:  [97.5 °F (36.4 °C)-98.1 °F (36.7 °C)] 97.5 °F (36.4 °C)  HR:  [55-60] 55  Resp:  [16-18] 16  BP: (121-140)/(71-74) 121/72  SpO2:  [93 %-98 %] 96 %  Body mass index is 35.71 kg/m².     Input and Output Summary (last 24 hours):     Intake/Output Summary (Last 24 hours) at 2024 0796  Last data filed at  1/11/2024 0854  Gross per 24 hour   Intake 3280 ml   Output --   Net 3280 ml       Physical Exam:   Physical Exam  Vitals and nursing note reviewed.   Constitutional:       Appearance: Normal appearance.   HENT:      Head: Normocephalic and atraumatic.      Right Ear: External ear normal.      Left Ear: External ear normal.      Nose: Nose normal.      Mouth/Throat:      Pharynx: Oropharynx is clear.   Eyes:      Pupils: Pupils are equal, round, and reactive to light.   Cardiovascular:      Rate and Rhythm: Normal rate and regular rhythm.      Heart sounds: Normal heart sounds.   Pulmonary:      Effort: Pulmonary effort is normal.      Breath sounds: Normal breath sounds.   Abdominal:      General: Bowel sounds are normal.      Palpations: Abdomen is soft.      Tenderness: There is abdominal tenderness.   Musculoskeletal:         General: Normal range of motion.      Cervical back: Normal range of motion and neck supple.   Skin:     General: Skin is warm and dry.      Capillary Refill: Capillary refill takes less than 2 seconds.   Neurological:      General: No focal deficit present.      Mental Status: She is alert and oriented to person, place, and time.   Psychiatric:         Mood and Affect: Mood normal.            Additional Data:     Labs:  Results from last 7 days   Lab Units 01/11/24  0449 01/10/24  1259   WBC Thousand/uL 5.30 6.75   HEMOGLOBIN g/dL 12.6 13.3   HEMATOCRIT % 41.3 41.9   PLATELETS Thousands/uL 221 257   NEUTROS PCT %  --  61   LYMPHS PCT %  --  30   MONOS PCT %  --  7   EOS PCT %  --  2     Results from last 7 days   Lab Units 01/11/24  0449   SODIUM mmol/L 140   POTASSIUM mmol/L 3.8   CHLORIDE mmol/L 106   CO2 mmol/L 25   BUN mg/dL 15   CREATININE mg/dL 1.01   ANION GAP mmol/L 9   CALCIUM mg/dL 9.0   ALBUMIN g/dL 3.6   TOTAL BILIRUBIN mg/dL 0.25   ALK PHOS U/L 47   ALT U/L 12   AST U/L 17   GLUCOSE RANDOM mg/dL 67         Results from last 7 days   Lab Units 01/11/24  1042 01/11/24  0816  01/11/24  0731 01/10/24  2117 01/10/24  1631   POC GLUCOSE mg/dl 98 106 61* 79 74         Results from last 7 days   Lab Units 01/10/24  1259 01/08/24  1420   LACTIC ACID mmol/L 1.5 1.0   PROCALCITONIN ng/ml 0.06  --        Lines/Drains:  Invasive Devices       Peripheral Intravenous Line  Duration             Peripheral IV 01/10/24 Right Antecubital <1 day                          Imaging: Reviewed radiology reports from this admission including: abdominal/pelvic CT    Recent Cultures (last 7 days):   Results from last 7 days   Lab Units 01/10/24  1316 01/10/24  1259 01/08/24  1633 01/08/24  1420   BLOOD CULTURE  Received in Microbiology Lab. Culture in Progress. Received in Microbiology Lab. Culture in Progress. No Growth at 48 hrs. No Growth at 48 hrs.       Last 24 Hours Medication List:   Current Facility-Administered Medications   Medication Dose Route Frequency Provider Last Rate    acetaminophen  650 mg Oral Q6H PRN Evette Murphy MD      ciprofloxacin  400 mg Intravenous Q12H Evette Murphy  mg (01/11/24 0219)    DULoxetine  60 mg Oral Daily Evette Murphy MD      enoxaparin  40 mg Subcutaneous Daily Evette Murphy MD      insulin lispro  1-6 Units Subcutaneous TID AC Evette Murphy MD      insulin lispro  1-6 Units Subcutaneous HS Evette Murphy MD      losartan  25 mg Oral Daily Evette Murphy MD      metroNIDAZOLE  500 mg Intravenous Q8H Evette Murphy  mg (01/11/24 0809)    morphine injection  2 mg Intravenous Q4H PRN Evette Murphy MD      ondansetron  4 mg Intravenous Q6H PRN Evette Murphy MD      oxyCODONE  5 mg Oral Q6H PRN Evette Murphy MD          Today, Patient Was Seen By: Evette Murphy MD    **Please Note: This note may have been constructed using a voice recognition system.**

## 2024-01-11 NOTE — ASSESSMENT & PLAN NOTE
Lab Results   Component Value Date    HGBA1C 10.0 (H) 07/14/2023       Recent Labs     01/10/24  2117 01/11/24  0731 01/11/24  0816 01/11/24  1042   POCGLU 79 61* 106 98       Blood Sugar Average: Last 72 hrs:  (P) 83.6Patient took Lantus 50 units this morning as per her usual regimen and also takes oral medications including metformin and Januvia.  Hold meds for now will place on full liquid diabetic diet for now and insulin sliding scale alone and monitor how she does and gradually add insulin based on her blood sugars

## 2024-01-11 NOTE — PLAN OF CARE
Problem: PAIN - ADULT  Goal: Verbalizes/displays adequate comfort level or baseline comfort level  Description: Interventions:  - Encourage patient to monitor pain and request assistance  - Assess pain using appropriate pain scale0-10  - Administer analgesics based on type and severity of pain and evaluate response  - Implement non-pharmacological measures as appropriate and evaluate response  - Consider cultural and social influences on pain and pain management  - Notify physician/advanced practitioner if interventions unsuccessful or patient reports new pain  Outcome: Progressing

## 2024-01-11 NOTE — ASSESSMENT & PLAN NOTE
Blood pressures are controlled will hold HCTZ and losartan tonight and placed on IV fluid hydration and monitor.  Will restart losartan and stop iv fluids

## 2024-01-12 LAB
GLUCOSE SERPL-MCNC: 123 MG/DL (ref 65–140)
GLUCOSE SERPL-MCNC: 125 MG/DL (ref 65–140)
GLUCOSE SERPL-MCNC: 151 MG/DL (ref 65–140)
GLUCOSE SERPL-MCNC: 176 MG/DL (ref 65–140)

## 2024-01-12 PROCEDURE — 82948 REAGENT STRIP/BLOOD GLUCOSE: CPT

## 2024-01-12 PROCEDURE — 99232 SBSQ HOSP IP/OBS MODERATE 35: CPT | Performed by: FAMILY MEDICINE

## 2024-01-12 RX ORDER — SENNOSIDES 8.6 MG
500 CAPSULE ORAL EVERY 8 HOURS PRN
COMMUNITY

## 2024-01-12 RX ORDER — VALSARTAN 80 MG/1
80 TABLET ORAL 2 TIMES DAILY
COMMUNITY

## 2024-01-12 RX ORDER — AMLODIPINE BESYLATE 5 MG/1
5 TABLET ORAL DAILY
Status: DISCONTINUED | OUTPATIENT
Start: 2024-01-12 | End: 2024-01-14 | Stop reason: HOSPADM

## 2024-01-12 RX ORDER — DOCUSATE SODIUM 100 MG/1
100 CAPSULE, LIQUID FILLED ORAL 2 TIMES DAILY
Status: DISCONTINUED | OUTPATIENT
Start: 2024-01-12 | End: 2024-01-14 | Stop reason: HOSPADM

## 2024-01-12 RX ORDER — SERTRALINE HYDROCHLORIDE 100 MG/1
100 TABLET, FILM COATED ORAL DAILY
COMMUNITY

## 2024-01-12 RX ORDER — SERTRALINE HYDROCHLORIDE 100 MG/1
100 TABLET, FILM COATED ORAL DAILY
Status: DISCONTINUED | OUTPATIENT
Start: 2024-01-12 | End: 2024-01-14 | Stop reason: HOSPADM

## 2024-01-12 RX ORDER — FERROUS GLUCONATE 324(38)MG
65 TABLET ORAL
COMMUNITY
End: 2024-01-14

## 2024-01-12 RX ORDER — LOSARTAN POTASSIUM 50 MG/1
50 TABLET ORAL DAILY
Status: DISCONTINUED | OUTPATIENT
Start: 2024-01-13 | End: 2024-01-14 | Stop reason: HOSPADM

## 2024-01-12 RX ORDER — POLYETHYLENE GLYCOL 3350 17 G/17G
17 POWDER, FOR SOLUTION ORAL ONCE
Status: COMPLETED | OUTPATIENT
Start: 2024-01-12 | End: 2024-01-12

## 2024-01-12 RX ORDER — MULTIVITAMIN
1 TABLET ORAL DAILY
COMMUNITY
End: 2024-01-14

## 2024-01-12 RX ADMIN — METRONIDAZOLE 500 MG: 500 INJECTION, SOLUTION INTRAVENOUS at 00:24

## 2024-01-12 RX ADMIN — ENOXAPARIN SODIUM 40 MG: 40 INJECTION SUBCUTANEOUS at 09:17

## 2024-01-12 RX ADMIN — DULOXETINE HYDROCHLORIDE 60 MG: 60 CAPSULE, DELAYED RELEASE ORAL at 09:16

## 2024-01-12 RX ADMIN — METRONIDAZOLE 500 MG: 500 INJECTION, SOLUTION INTRAVENOUS at 09:17

## 2024-01-12 RX ADMIN — CIPROFLOXACIN 400 MG: 2 INJECTION, SOLUTION INTRAVENOUS at 04:37

## 2024-01-12 RX ADMIN — SERTRALINE 100 MG: 100 TABLET, FILM COATED ORAL at 09:20

## 2024-01-12 RX ADMIN — DOCUSATE SODIUM 100 MG: 100 CAPSULE, LIQUID FILLED ORAL at 16:56

## 2024-01-12 RX ADMIN — METRONIDAZOLE 500 MG: 500 INJECTION, SOLUTION INTRAVENOUS at 16:57

## 2024-01-12 RX ADMIN — LOSARTAN POTASSIUM 25 MG: 25 TABLET, FILM COATED ORAL at 09:16

## 2024-01-12 RX ADMIN — DOCUSATE SODIUM 100 MG: 100 CAPSULE, LIQUID FILLED ORAL at 09:16

## 2024-01-12 RX ADMIN — POLYETHYLENE GLYCOL 3350 17 G: 17 POWDER, FOR SOLUTION ORAL at 09:09

## 2024-01-12 RX ADMIN — OXYCODONE HYDROCHLORIDE 5 MG: 5 TABLET ORAL at 22:09

## 2024-01-12 RX ADMIN — OXYCODONE HYDROCHLORIDE 5 MG: 5 TABLET ORAL at 10:54

## 2024-01-12 RX ADMIN — INSULIN LISPRO 1 UNITS: 100 INJECTION, SOLUTION INTRAVENOUS; SUBCUTANEOUS at 22:10

## 2024-01-12 RX ADMIN — INSULIN LISPRO 1 UNITS: 100 INJECTION, SOLUTION INTRAVENOUS; SUBCUTANEOUS at 12:16

## 2024-01-12 RX ADMIN — AMLODIPINE BESYLATE 5 MG: 5 TABLET ORAL at 09:20

## 2024-01-12 RX ADMIN — CIPROFLOXACIN 400 MG: 2 INJECTION, SOLUTION INTRAVENOUS at 16:57

## 2024-01-12 NOTE — ASSESSMENT & PLAN NOTE
Started having abdominal pain nausea and constipation 4 days ago initially was in the ED was told that she has a diverticulitis was discharged on antibiotics however after she went home she felt that sharp pain was still the same and she was not feeling better and returned back to the ED today.  CT abdomen pelvis today shows  Acute diverticulitis of the distal descending colon in the left lower quadrant with possible 1.2 cm intramural abscess     Patient was recently prescribed Augmentin however she did not really take that much to consider it failure yet.  Will place on Cipro and Flagyl for now IV   Still having some nausea and abdominal discomfort and constipation will advance her to a soft diet today and also give her a dose of MiraLAX and see how she does.    Discussed with patient that she will eventually need a colonoscopy to be done in 4 to 6 weeks.  This is the patient's first bout of diverticulitis.  Last colonoscopy was over 5 years ago

## 2024-01-12 NOTE — PROGRESS NOTES
[facility]  Progress Note  Name: Radha May I  MRN: [id number]  Unit/Bed#: -01 I Date of Admission: 1/10/2024   Date of Service: 1/12/2024 I Hospital Day: 2    Assessment/Plan   * Acute diverticulitis  Assessment & Plan  Started having abdominal pain nausea and constipation 4 days ago initially was in the ED was told that she has a diverticulitis was discharged on antibiotics however after she went home she felt that sharp pain was still the same and she was not feeling better and returned back to the ED today.  CT abdomen pelvis today shows  Acute diverticulitis of the distal descending colon in the left lower quadrant with possible 1.2 cm intramural abscess     Patient was recently prescribed Augmentin however she did not really take that much to consider it failure yet.  Will place on Cipro and Flagyl for now IV   Still having some nausea and abdominal discomfort and constipation will advance her to a soft diet today and also give her a dose of MiraLAX and see how she does.    Discussed with patient that she will eventually need a colonoscopy to be done in 4 to 6 weeks.  This is the patient's first bout of diverticulitis.  Last colonoscopy was over 5 years ago    Type 2 diabetes mellitus with hypoglycemia without coma, with long-term current use of insulin ([facility])  Assessment & Plan  Lab Results   Component Value Date    HGBA1C 10.0 (H) 07/14/2023       Recent Labs     01/11/24  1042 01/11/24  1553 01/11/24 2029 01/12/24  0726   POCGLU 98 94 119 125         Blood Sugar Average: Last 72 hrs:  (P) 94.5Patient took Lantus 50 units this morning as per her usual regimen and also takes oral medications including metformin and Januvia.  Hold meds for now will place on full liquid diabetic diet for now and insulin sliding scale alone and monitor how she does and gradually add insulin based on her blood sugars      Hyperlipidemia, mixed  Assessment & Plan  Hold statins while patient's  oral intake is poor    Essential hypertension  Assessment & Plan  Blood pressures are elevated continue Cozaar and added Norvasc               VTE Pharmacologic Prophylaxis:   Moderate Risk (Score 3-4) - Pharmacological DVT Prophylaxis Ordered: enoxaparin (Lovenox).    Mobility:   Basic Mobility Inpatient Raw Score: 24  JH-HLM Goal: 8: Walk 250 feet or more  JH-HLM Achieved: 8: Walk 250 feet ot more  HLM Goal achieved. Continue to encourage appropriate mobility.    Patient Centered Rounds: I performed bedside rounds with nursing staff today.   Discussions with Specialists or Other Care Team Provider: none    Education and Discussions with Family / Patient:     Total Time Spent on Date of Encounter in care of patient: 35 mins. This time was spent on one or more of the following: performing physical exam; counseling and coordination of care; obtaining or reviewing history; documenting in the medical record; reviewing/ordering tests, medications or procedures; communicating with other healthcare professionals and discussing with patient's family/caregivers.    Current Length of Stay: 2 day(s)  Current Patient Status: Inpatient   Certification Statement: The patient will continue to require additional inpatient hospital stay due to acute diverticulitis  Discharge Plan: Anticipate discharge tomorrow to home.    Code Status: Level 1 - Full Code    Subjective:   Patient still complains of some abdominal discomfort no bowel movement yet only tolerating liquid diet so far.    Objective:     Vitals:   Temp (24hrs), Av.2 °F (36.2 °C), Min:97 °F (36.1 °C), Max:97.3 °F (36.3 °C)    Temp:  [97 °F (36.1 °C)-97.3 °F (36.3 °C)] 97.3 °F (36.3 °C)  HR:  [54-57] 54  Resp:  [14-18] 17  BP: (163-178)/(76-84) 178/84  SpO2:  [93 %-94 %] 94 %  Body mass index is 35.71 kg/m².     Input and Output Summary (last 24 hours):     Intake/Output Summary (Last 24 hours) at 2024 1130  Last data filed at 2024 0905  Gross per 24 hour    Intake 1450 ml   Output --   Net 1450 ml       Physical Exam:   Physical Exam  Vitals and nursing note reviewed.   Constitutional:       Appearance: Normal appearance.   HENT:      Head: Normocephalic and atraumatic.      Right Ear: External ear normal.      Left Ear: External ear normal.      Nose: Nose normal.      Mouth/Throat:      Pharynx: Oropharynx is clear.   Eyes:      Pupils: Pupils are equal, round, and reactive to light.   Cardiovascular:      Rate and Rhythm: Normal rate and regular rhythm.      Heart sounds: Normal heart sounds.   Pulmonary:      Effort: Pulmonary effort is normal.      Breath sounds: Normal breath sounds.   Abdominal:      General: Bowel sounds are normal.      Palpations: Abdomen is soft.      Tenderness: There is abdominal tenderness.   Musculoskeletal:         General: Normal range of motion.      Cervical back: Normal range of motion and neck supple.   Skin:     General: Skin is warm and dry.      Capillary Refill: Capillary refill takes less than 2 seconds.   Neurological:      General: No focal deficit present.      Mental Status: She is alert and oriented to person, place, and time.   Psychiatric:         Mood and Affect: Mood normal.            Additional Data:     Labs:  Results from last 7 days   Lab Units 01/11/24  0449 01/10/24  1259   WBC Thousand/uL 5.30 6.75   HEMOGLOBIN g/dL 12.6 13.3   HEMATOCRIT % 41.3 41.9   PLATELETS Thousands/uL 221 257   NEUTROS PCT %  --  61   LYMPHS PCT %  --  30   MONOS PCT %  --  7   EOS PCT %  --  2     Results from last 7 days   Lab Units 01/11/24  0449   SODIUM mmol/L 140   POTASSIUM mmol/L 3.8   CHLORIDE mmol/L 106   CO2 mmol/L 25   BUN mg/dL 15   CREATININE mg/dL 1.01   ANION GAP mmol/L 9   CALCIUM mg/dL 9.0   ALBUMIN g/dL 3.6   TOTAL BILIRUBIN mg/dL 0.25   ALK PHOS U/L 47   ALT U/L 12   AST U/L 17   GLUCOSE RANDOM mg/dL 67         Results from last 7 days   Lab Units 01/12/24  0726 01/11/24  2029 01/11/24  1553 01/11/24  1042  01/11/24  0816 01/11/24  0731 01/10/24  2117 01/10/24  1631   POC GLUCOSE mg/dl 125 119 94 98 106 61* 79 74         Results from last 7 days   Lab Units 01/10/24  1259 01/08/24  1420   LACTIC ACID mmol/L 1.5 1.0   PROCALCITONIN ng/ml 0.06  --        Lines/Drains:  Invasive Devices       Peripheral Intravenous Line  Duration             Peripheral IV 01/10/24 Right Antecubital 1 day                          Imaging: Reviewed radiology reports from this admission including: abdominal/pelvic CT    Recent Cultures (last 7 days):   Results from last 7 days   Lab Units 01/10/24  1316 01/10/24  1259 01/08/24  1633 01/08/24  1420   BLOOD CULTURE  No Growth at 24 hrs. No Growth at 24 hrs. No Growth at 72 hrs. No Growth at 72 hrs.       Last 24 Hours Medication List:   Current Facility-Administered Medications   Medication Dose Route Frequency Provider Last Rate    acetaminophen  650 mg Oral Q6H PRN Evette Murphy MD      amLODIPine  5 mg Oral Daily Evette Murphy MD      bisacodyl  10 mg Rectal Daily Evette Murphy MD      ciprofloxacin  400 mg Intravenous Q12H Evette Murphy  mg (01/12/24 0437)    docusate sodium  100 mg Oral BID Evette Murphy MD      DULoxetine  60 mg Oral Daily Evette Murphy MD      enoxaparin  40 mg Subcutaneous Daily Evette Murphy MD      hydrOXYzine HCL  25 mg Oral Q6H PRN Evette Murphy MD      insulin lispro  1-6 Units Subcutaneous TID AC Evette Murphy MD      insulin lispro  1-6 Units Subcutaneous HS Evette Murphy MD      [START ON 1/13/2024] losartan  50 mg Oral Daily Evette Murphy MD      metroNIDAZOLE  500 mg Intravenous Q8H Evette Murphy  mg (01/12/24 0917)    morphine injection  2 mg Intravenous Q4H PRN Evette Murphy MD      ondansetron  4 mg Intravenous Q6H PRN Evette Murphy MD      oxyCODONE  5 mg Oral Q6H PRN Evette Murphy MD      sertraline  100 mg Oral Daily Evette Murphy MD          Today, Patient Was Seen By: Evette Murphy MD    **Please Note: This note may have been constructed using a voice  recognition system.**

## 2024-01-12 NOTE — PLAN OF CARE
Problem: PAIN - ADULT  Goal: Verbalizes/displays adequate comfort level or baseline comfort level  Description: Interventions:  - Encourage patient to monitor pain and request assistance  - Assess pain using appropriate pain scale0-10  - Administer analgesics based on type and severity of pain and evaluate response  - Implement non-pharmacological measures as appropriate and evaluate response  - Consider cultural and social influences on pain and pain management  - Notify physician/advanced practitioner if interventions unsuccessful or patient reports new pain  Outcome: Progressing     Problem: INFECTION - ADULT  Goal: Absence or prevention of progression during hospitalization  Description: INTERVENTIONS:  - Assess and monitor for signs and symptoms of infection  - Monitor lab/diagnostic results  - Monitor all insertion sites, i.e. indwelling lines, tubes, and drains  - Monitor endotracheal if appropriate and nasal secretions for changes in amount and color  - [place] appropriate cooling/warming therapies per order  - Administer medications as ordered  - Instruct and encourage patient and family to use good hand hygiene technique  - Identify and instruct in appropriate isolation precautions for identified infection/condition  Outcome: Progressing     Problem: SAFETY ADULT  Goal: Patient will remain free of falls  Description: INTERVENTIONS:  - Educate patient/family on patient safety including physical limitations  - Instruct patient to call for assistance with activity   - Consult OT/PT to assist with strengthening/mobility   - Keep Call bell within reach  - Keep bed low and locked with side rails adjusted as appropriate  - Keep care items and personal belongings within reach  - Initiate and maintain comfort rounds  - Make Fall Risk Sign visible to staff  - Offer Toileting every 2 Hours, in advance of need  - Initiate/Maintain alarm  - Obtain necessary fall risk management equipment  - Apply yellow socks and  bracelet for high fall risk patients  - Consider moving patient to room near nurses station  Outcome: Progressing  Goal: Maintain or return to baseline ADL function  Description: INTERVENTIONS:  -  Assess patient's ability to carry out ADLs; assess patient's baseline for ADL function and identify physical deficits which impact ability to perform ADLs (bathing, care of mouth/teeth, toileting, grooming, dressing, etc.)  - Assess/evaluate cause of self-care deficits   - Assess range of motion  - Assess patient's mobility; develop plan if impaired  - Assess patient's need for assistive devices and provide as appropriate  - Encourage maximum independence but intervene and supervise when necessary  - Involve family in performance of ADLs  - Assess for home care needs following discharge   - Consider OT consult to assist with ADL evaluation and planning for discharge  - Provide patient education as appropriate  Outcome: Progressing  Goal: Maintains/Returns to pre admission functional level  Description: INTERVENTIONS:  - Perform AM-PAC 6 Click Basic Mobility/ Daily Activity assessment daily.  - Set and communicate daily mobility goal to care team and patient/family/caregiver.   - Collaborate with rehabilitation services on mobility goals if consulted  - Perform Range of Motion - times a day.  - Reposition patient every - hours.  - Dangle patient - times a day  - Stand patient - times a day  - Ambulate patient - times a day  - Out of bed to chair - times a day   - Out of bed for meals - times a day  - Out of bed for toileting  - Record patient progress and toleration of activity level   Outcome: Progressing     Problem: DISCHARGE PLANNING  Goal: Discharge to home or other facility with appropriate resources  Description: INTERVENTIONS:  - Identify barriers to discharge w/patient and caregiver  - Arrange for needed discharge resources and transportation as appropriate  - Identify discharge learning needs (meds, wound care,  etc.)  - Arrange for interpretive services to assist at discharge as needed  - Refer to Case Management Department for coordinating discharge planning if the patient needs post-hospital services based on physician/advanced practitioner order or complex needs related to functional status, cognitive ability, or social support system  Outcome: Progressing     Problem: Knowledge Deficit  Goal: Patient/family/caregiver demonstrates understanding of disease process, treatment plan, medications, and discharge instructions  Description: Complete learning assessment and assess knowledge base.  Interventions:  - Provide teaching at level of understanding  - Provide teaching via preferred learning methods  Outcome: Progressing     Problem: Prexisting or High Potential for Compromised Skin Integrity  Goal: Skin integrity is maintained or improved  Description: INTERVENTIONS:  - Identify patients at risk for skin breakdown  - Assess and monitor skin integrity  - Assess and monitor nutrition and hydration status  - Monitor labs   - Assess for incontinence   - Turn and reposition patient  - Assist with mobility/ambulation  - Relieve pressure over bony prominences  - Avoid friction and shearing  - Provide appropriate hygiene as needed including keeping skin clean and dry  - Evaluate need for skin moisturizer/barrier cream  - Collaborate with interdisciplinary team   - Patient/family teaching  - Consider wound care consult   Outcome: Progressing

## 2024-01-12 NOTE — ASSESSMENT & PLAN NOTE
Lab Results   Component Value Date    HGBA1C 10.0 (H) 07/14/2023       Recent Labs     01/11/24  1042 01/11/24  1553 01/11/24 2029 01/12/24  0726   POCGLU 98 94 119 125         Blood Sugar Average: Last 72 hrs:  (P) 94.5Patient took Lantus 50 units this morning as per her usual regimen and also takes oral medications including metformin and Januvia.  Hold meds for now will place on full liquid diabetic diet for now and insulin sliding scale alone and monitor how she does and gradually add insulin based on her blood sugars

## 2024-01-13 LAB
ANION GAP SERPL CALCULATED.3IONS-SCNC: 8 MMOL/L
BACTERIA BLD CULT: NORMAL
BACTERIA BLD CULT: NORMAL
BUN SERPL-MCNC: 15 MG/DL (ref 5–25)
CALCIUM SERPL-MCNC: 9.3 MG/DL (ref 8.4–10.2)
CHLORIDE SERPL-SCNC: 104 MMOL/L (ref 96–108)
CO2 SERPL-SCNC: 26 MMOL/L (ref 21–32)
CREAT SERPL-MCNC: 1.02 MG/DL (ref 0.6–1.3)
ERYTHROCYTE [DISTWIDTH] IN BLOOD BY AUTOMATED COUNT: 14.9 % (ref 11.6–15.1)
GFR SERPL CREATININE-BSD FRML MDRD: 53 ML/MIN/1.73SQ M
GLUCOSE SERPL-MCNC: 134 MG/DL (ref 65–140)
GLUCOSE SERPL-MCNC: 145 MG/DL (ref 65–140)
GLUCOSE SERPL-MCNC: 150 MG/DL (ref 65–140)
GLUCOSE SERPL-MCNC: 157 MG/DL (ref 65–140)
GLUCOSE SERPL-MCNC: 196 MG/DL (ref 65–140)
HCT VFR BLD AUTO: 40.8 % (ref 34.8–46.1)
HGB BLD-MCNC: 12.7 G/DL (ref 11.5–15.4)
MCH RBC QN AUTO: 25.4 PG (ref 26.8–34.3)
MCHC RBC AUTO-ENTMCNC: 31.1 G/DL (ref 31.4–37.4)
MCV RBC AUTO: 82 FL (ref 82–98)
PLATELET # BLD AUTO: 244 THOUSANDS/UL (ref 149–390)
PMV BLD AUTO: 9.4 FL (ref 8.9–12.7)
POTASSIUM SERPL-SCNC: 3.9 MMOL/L (ref 3.5–5.3)
RBC # BLD AUTO: 5 MILLION/UL (ref 3.81–5.12)
SODIUM SERPL-SCNC: 138 MMOL/L (ref 135–147)
WBC # BLD AUTO: 6.21 THOUSAND/UL (ref 4.31–10.16)

## 2024-01-13 PROCEDURE — 80048 BASIC METABOLIC PNL TOTAL CA: CPT | Performed by: FAMILY MEDICINE

## 2024-01-13 PROCEDURE — 85027 COMPLETE CBC AUTOMATED: CPT | Performed by: FAMILY MEDICINE

## 2024-01-13 PROCEDURE — 99232 SBSQ HOSP IP/OBS MODERATE 35: CPT | Performed by: FAMILY MEDICINE

## 2024-01-13 PROCEDURE — 99239 HOSP IP/OBS DSCHRG MGMT >30: CPT | Performed by: FAMILY MEDICINE

## 2024-01-13 PROCEDURE — 82948 REAGENT STRIP/BLOOD GLUCOSE: CPT

## 2024-01-13 RX ORDER — AMLODIPINE BESYLATE 5 MG/1
5 TABLET ORAL DAILY
Qty: 30 TABLET | Refills: 0 | Status: SHIPPED | OUTPATIENT
Start: 2024-01-14 | End: 2024-02-13

## 2024-01-13 RX ORDER — DOCUSATE SODIUM 100 MG/1
100 CAPSULE, LIQUID FILLED ORAL 2 TIMES DAILY
Qty: 60 CAPSULE | Refills: 0 | Status: SHIPPED | OUTPATIENT
Start: 2024-01-13 | End: 2024-02-12

## 2024-01-13 RX ORDER — POLYETHYLENE GLYCOL 3350 17 G/17G
17 POWDER, FOR SOLUTION ORAL DAILY PRN
Qty: 1 EACH | Refills: 0 | Status: SHIPPED | OUTPATIENT
Start: 2024-01-13 | End: 2024-02-12

## 2024-01-13 RX ORDER — POLYETHYLENE GLYCOL 3350 17 G/17G
17 POWDER, FOR SOLUTION ORAL DAILY PRN
Status: DISCONTINUED | OUTPATIENT
Start: 2024-01-13 | End: 2024-01-14 | Stop reason: HOSPADM

## 2024-01-13 RX ADMIN — METRONIDAZOLE 500 MG: 500 INJECTION, SOLUTION INTRAVENOUS at 17:24

## 2024-01-13 RX ADMIN — METRONIDAZOLE 500 MG: 500 INJECTION, SOLUTION INTRAVENOUS at 23:36

## 2024-01-13 RX ADMIN — ONDANSETRON 4 MG: 2 INJECTION INTRAMUSCULAR; INTRAVENOUS at 20:19

## 2024-01-13 RX ADMIN — CIPROFLOXACIN 400 MG: 2 INJECTION, SOLUTION INTRAVENOUS at 04:44

## 2024-01-13 RX ADMIN — ENOXAPARIN SODIUM 40 MG: 40 INJECTION SUBCUTANEOUS at 08:06

## 2024-01-13 RX ADMIN — ONDANSETRON 4 MG: 2 INJECTION INTRAMUSCULAR; INTRAVENOUS at 14:33

## 2024-01-13 RX ADMIN — METRONIDAZOLE 500 MG: 500 INJECTION, SOLUTION INTRAVENOUS at 00:46

## 2024-01-13 RX ADMIN — LOSARTAN POTASSIUM 50 MG: 50 TABLET, FILM COATED ORAL at 08:06

## 2024-01-13 RX ADMIN — CIPROFLOXACIN 400 MG: 2 INJECTION, SOLUTION INTRAVENOUS at 16:05

## 2024-01-13 RX ADMIN — INSULIN LISPRO 2 UNITS: 100 INJECTION, SOLUTION INTRAVENOUS; SUBCUTANEOUS at 11:34

## 2024-01-13 RX ADMIN — SERTRALINE 100 MG: 100 TABLET, FILM COATED ORAL at 08:06

## 2024-01-13 RX ADMIN — INSULIN LISPRO 1 UNITS: 100 INJECTION, SOLUTION INTRAVENOUS; SUBCUTANEOUS at 16:04

## 2024-01-13 RX ADMIN — POLYETHYLENE GLYCOL 3350 17 G: 17 POWDER, FOR SOLUTION ORAL at 10:38

## 2024-01-13 RX ADMIN — DOCUSATE SODIUM 100 MG: 100 CAPSULE, LIQUID FILLED ORAL at 16:03

## 2024-01-13 RX ADMIN — AMLODIPINE BESYLATE 5 MG: 5 TABLET ORAL at 08:06

## 2024-01-13 RX ADMIN — MAGNESIUM HYDROXIDE 30 ML: 400 SUSPENSION ORAL at 17:45

## 2024-01-13 RX ADMIN — DOCUSATE SODIUM 100 MG: 100 CAPSULE, LIQUID FILLED ORAL at 08:06

## 2024-01-13 RX ADMIN — METRONIDAZOLE 500 MG: 500 INJECTION, SOLUTION INTRAVENOUS at 08:00

## 2024-01-13 RX ADMIN — HYDROXYZINE HYDROCHLORIDE 25 MG: 25 TABLET ORAL at 20:19

## 2024-01-13 RX ADMIN — INSULIN LISPRO 1 UNITS: 100 INJECTION, SOLUTION INTRAVENOUS; SUBCUTANEOUS at 21:39

## 2024-01-13 RX ADMIN — BISACODYL 10 MG: 10 SUPPOSITORY RECTAL at 08:05

## 2024-01-13 RX ADMIN — DULOXETINE HYDROCHLORIDE 60 MG: 60 CAPSULE, DELAYED RELEASE ORAL at 08:06

## 2024-01-13 NOTE — ASSESSMENT & PLAN NOTE
Started having abdominal pain nausea and constipation 4 days ago initially was in the ED was told that she has a diverticulitis was discharged on antibiotics however after she went home she felt that sharp pain was still the same and she was not feeling better and returned back to the ED today.  CT abdomen pelvis today shows  Acute diverticulitis of the distal descending colon in the left lower quadrant with possible 1.2 cm intramural abscess     Patient was recently prescribed Augmentin however she did not really take that much to consider it failure yet.  Will place on Cipro and Flagyl for now IV     Discussed with patient that she will eventually need a colonoscopy to be done in 4 to 6 weeks.  This is the patient's first bout of diverticulitis.  Last colonoscopy was over 5 years ago  Patient tolerating soft diet.  Will give her MiraLAX to help her have a bowel movement today.  Once she has a bowel movement and continues to have no pain will be discharged home later today with a course of oral antibiotics to be continued for 7 more days

## 2024-01-13 NOTE — ASSESSMENT & PLAN NOTE
Lab Results   Component Value Date    HGBA1C 10.0 (H) 07/14/2023       Recent Labs     01/12/24  1134 01/12/24  1534 01/12/24  2137 01/13/24  0719   POCGLU 176* 123 151* 134         Blood Sugar Average: Last 72 hrs:  (P) [id number] took Lantus 50 units this morning as per her usual regimen and also takes oral medications including metformin and Januvia.  Start modified regimen on discharge

## 2024-01-13 NOTE — DISCHARGE SUMMARY
[facility]  Discharge- Radha May 1945, 78 y.o. female MRN: [id number]  Unit/Bed#: -01 Encounter: [id number]  Primary Care Provider: Keyana Phan MD   Date and time admitted to hospital: 1/10/2024 12:36 PM    * Acute diverticulitis  Assessment & Plan  Started having abdominal pain nausea and constipation 4 days ago initially was in the ED was told that she has a diverticulitis was discharged on antibiotics however after she went home she felt that sharp pain was still the same and she was not feeling better and returned back to the ED today.  CT abdomen pelvis today shows  Acute diverticulitis of the distal descending colon in the left lower quadrant with possible 1.2 cm intramural abscess     Patient was recently prescribed Augmentin however she did not really take that much to consider it failure yet.  Will place on Cipro and Flagyl for now IV     Discussed with patient that she will eventually need a colonoscopy to be done in 4 to 6 weeks.  This is the patient's first bout of diverticulitis.  Last colonoscopy was over 5 years ago  Patient tolerating soft diet.  Will give her MiraLAX to help her have a bowel movement today.  Once she has a bowel movement and continues to have no pain will be discharged home later today with a course of oral antibiotics to be continued for 7 more days    Type 2 diabetes mellitus with hypoglycemia without coma, with long-term current use of insulin (HCC)  Assessment & Plan  Lab Results   Component Value Date    HGBA1C 10.0 (H) 07/14/2023       Recent Labs     01/12/24  1134 01/12/24  1534 01/12/24  2137 01/13/24  0719   POCGLU 176* 123 151* 134         Blood Sugar Average: Last 72 hrs:  (P) [id number] took Lantus 50 units this morning as per her usual regimen and also takes oral medications including metformin and Januvia.  Start modified regimen on discharge      Hyperlipidemia, mixed  Assessment & Plan  Hold statins  while patient's oral intake is poor    Essential hypertension  Assessment & Plan  Blood pressures are elevated continue Cozaar and added Norvasc        Discharging Physician / Practitioner: Evette Murphy MD  PCP: Keyana Phan MD  Admission Date:   Admission Orders (From admission, onward)       Ordered        01/10/24 1613  Inpatient Admission  Once                          Discharge Date: 01/14/2024    Medical Problems       Resolved Problems  Date Reviewed: 1/13/2024   None         Consultations During Hospital Stay:  None    Procedures Performed:   none    Significant Findings / Test Results:   CT abdomen pelvis with contrast    Result Date: 1/10/2024  Impression: Acute diverticulitis of the distal descending colon in the left lower quadrant with possible 1.2 cm intramural abscess is not significantly changed since January 8, 2024. The study was marked in EPIC for immediate notification. Workstation performed: [id number]      Incidental Findings:   none    Test Results Pending at Discharge (will require follow up):   none     Outpatient Tests Requested:  Outpatient follow-up with GI    Complications: None    Reason for Admission: Acute diverticulitis    Hospital Course:     Radha May is a 78 y.o. female patient who originally presented to the hospital on 1/10/2024 due to acute diverticulitis with small intramural abscess without perforation.  Patient was treated with IV fluids IV antibiotics bowel rest gradually advanced on diet and starting to improve with no pain reported but still a little constipated.  Will get some stool softeners today to help her have a bowel movement prior to discharging home with a 7-day course of antibiotics and outpatient follow-up with GI for possible flex sig or colonoscopy in 4 to 6 weeks        Please see above list of diagnoses and related plan for additional information.     Condition at Discharge: good     Discharge Day Visit / Exam:     Subjective: Patient denies any  "chest pain or shortness of breath or abdominal pain but still constipated  Vitals: Blood Pressure: 145/73 (01/13/24 0926)  Pulse: 59 (01/13/24 0926)  Temperature: (!) 97 °F (36.1 °C) (01/13/24 0639)  Temp Source: Temporal (01/13/24 0639)  Respirations: 16 (01/13/24 0720)  Height: 5' 1\" (154.9 cm) (01/10/24 2128)  Weight - Scale: 85.7 kg (189 lb) (01/10/24 2128)  SpO2: (!) 82 % (01/13/24 0926).  Repeat Pulse ox check was 94% on room air  Exam:   Physical Exam  Vitals and nursing note reviewed.   Constitutional:       Appearance: Normal appearance.   HENT:      Head: Normocephalic and atraumatic.      Right Ear: External ear normal.      Left Ear: External ear normal.      Nose: Nose normal.      Mouth/Throat:      Pharynx: Oropharynx is clear.   Cardiovascular:      Rate and Rhythm: Normal rate and regular rhythm.      Heart sounds: Normal heart sounds.   Pulmonary:      Effort: Pulmonary effort is normal.      Breath sounds: Normal breath sounds.   Abdominal:      General: Bowel sounds are normal.      Palpations: Abdomen is soft.      Tenderness: There is no abdominal tenderness.   Musculoskeletal:         General: Normal range of motion.      Cervical back: Normal range of motion and neck supple.   Skin:     General: Skin is warm and dry.      Capillary Refill: Capillary refill takes less than 2 seconds.   Neurological:      General: No focal deficit present.      Mental Status: She is alert and oriented to person, place, and time.   Psychiatric:         Mood and Affect: Mood normal.         Discussion with Family: Updated sister    Discharge instructions/Information to patient and family:   See after visit summary for information provided to patient and family.      Provisions for Follow-Up Care:  See after visit summary for information related to follow-up care and any pertinent home health orders.      Disposition:     Home    For Discharges to [facility] SNF:   Not Applicable to this Patient - Not " Applicable to this Patient    Planned Readmission: none     Discharge Statement:  I spent 35 minutes discharging the patient. This time was spent on the day of discharge. I had direct contact with the patient on the day of discharge. Greater than 50% of the total time was spent examining patient, answering all patient questions, arranging and discussing plan of care with patient as well as directly providing post-discharge instructions.  Additional time then spent on discharge activities.    Discharge Medications:  See after visit summary for reconciled discharge medications provided to patient and family.      ** Please Note: This note has been constructed using a voice recognition system **

## 2024-01-13 NOTE — PLAN OF CARE
Problem: PAIN - ADULT  Goal: Verbalizes/displays adequate comfort level or baseline comfort level  Description: Interventions:  - Encourage patient to monitor pain and request assistance  - Assess pain using appropriate pain scale0-10  - Administer analgesics based on type and severity of pain and evaluate response  - Implement non-pharmacological measures as appropriate and evaluate response  - Consider cultural and social influences on pain and pain management  - Notify physician/advanced practitioner if interventions unsuccessful or patient reports new pain  Outcome: Progressing     Problem: INFECTION - ADULT  Goal: Absence or prevention of progression during hospitalization  Description: INTERVENTIONS:  - Assess and monitor for signs and symptoms of infection  - Monitor lab/diagnostic results  - Monitor all insertion sites, i.e. indwelling lines, tubes, and drains  - Monitor endotracheal if appropriate and nasal secretions for changes in amount and color  - [place] appropriate cooling/warming therapies per order  - Administer medications as ordered  - Instruct and encourage patient and family to use good hand hygiene technique  - Identify and instruct in appropriate isolation precautions for identified infection/condition  Outcome: Progressing  Goal: Absence of fever/infection during neutropenic period  Description: INTERVENTIONS:  - Monitor WBC    Outcome: Progressing     Problem: SAFETY ADULT  Goal: Patient will remain free of falls  Description: INTERVENTIONS:  - Educate patient/family on patient safety including physical limitations  - Instruct patient to call for assistance with activity   - Consult OT/PT to assist with strengthening/mobility   - Keep Call bell within reach  - Keep bed low and locked with side rails adjusted as appropriate  - Keep care items and personal belongings within reach  - Initiate and maintain comfort rounds  - Make Fall Risk Sign visible to staff  - Offer Toileting every 2  Hours, in advance of need  - Initiate/Maintain alarm  - Obtain necessary fall risk management equipment  - Apply yellow socks and bracelet for high fall risk patients  - Consider moving patient to room near nurses station  Outcome: Progressing  Goal: Maintain or return to baseline ADL function  Description: INTERVENTIONS:  -  Assess patient's ability to carry out ADLs; assess patient's baseline for ADL function and identify physical deficits which impact ability to perform ADLs (bathing, care of mouth/teeth, toileting, grooming, dressing, etc.)  - Assess/evaluate cause of self-care deficits   - Assess range of motion  - Assess patient's mobility; develop plan if impaired  - Assess patient's need for assistive devices and provide as appropriate  - Encourage maximum independence but intervene and supervise when necessary  - Involve family in performance of ADLs  - Assess for home care needs following discharge   - Consider OT consult to assist with ADL evaluation and planning for discharge  - Provide patient education as appropriate  Outcome: Progressing  Goal: Maintains/Returns to pre admission functional level  Description: INTERVENTIONS:  - Perform AM-PAC 6 Click Basic Mobility/ Daily Activity assessment daily.  - Set and communicate daily mobility goal to care team and patient/family/caregiver.   - Collaborate with rehabilitation services on mobility goals if consulted  - Perform Range of Motion 3 times a day.  - Reposition patient every 2 hours.  - Dangle patient 3 times a day  - Stand patient 3 times a day  - Ambulate patient 3 times a day  - Out of bed to chair 3 times a day   - Out of bed for meals 3 times a day  - Out of bed for toileting  - Record patient progress and toleration of activity level   Outcome: Progressing     Problem: DISCHARGE PLANNING  Goal: Discharge to home or other facility with appropriate resources  Description: INTERVENTIONS:  - Identify barriers to discharge w/patient and caregiver  -  Arrange for needed discharge resources and transportation as appropriate  - Identify discharge learning needs (meds, wound care, etc.)  - Arrange for interpretive services to assist at discharge as needed  - Refer to Case Management Department for coordinating discharge planning if the patient needs post-hospital services based on physician/advanced practitioner order or complex needs related to functional status, cognitive ability, or social support system  Outcome: Progressing     Problem: Knowledge Deficit  Goal: Patient/family/caregiver demonstrates understanding of disease process, treatment plan, medications, and discharge instructions  Description: Complete learning assessment and assess knowledge base.  Interventions:  - Provide teaching at level of understanding  - Provide teaching via preferred learning methods  Outcome: Progressing     Problem: Prexisting or High Potential for Compromised Skin Integrity  Goal: Skin integrity is maintained or improved  Description: INTERVENTIONS:  - Identify patients at risk for skin breakdown  - Assess and monitor skin integrity  - Assess and monitor nutrition and hydration status  - Monitor labs   - Assess for incontinence   - Turn and reposition patient  - Assist with mobility/ambulation  - Relieve pressure over bony prominences  - Avoid friction and shearing  - Provide appropriate hygiene as needed including keeping skin clean and dry  - Evaluate need for skin moisturizer/barrier cream  - Collaborate with interdisciplinary team   - Patient/family teaching  - Consider wound care consult   Outcome: Progressing

## 2024-01-14 ENCOUNTER — APPOINTMENT (INPATIENT)
Dept: RADIOLOGY | Facility: HOSPITAL | Age: 79
DRG: 392 | End: 2024-01-14
Payer: MEDICARE

## 2024-01-14 VITALS
BODY MASS INDEX: 35.68 KG/M2 | WEIGHT: 189 LBS | RESPIRATION RATE: 17 BRPM | HEART RATE: 57 BPM | DIASTOLIC BLOOD PRESSURE: 71 MMHG | SYSTOLIC BLOOD PRESSURE: 138 MMHG | OXYGEN SATURATION: 94 % | HEIGHT: 61 IN | TEMPERATURE: 97.2 F

## 2024-01-14 LAB
ANION GAP SERPL CALCULATED.3IONS-SCNC: 7 MMOL/L
BUN SERPL-MCNC: 17 MG/DL (ref 5–25)
CALCIUM SERPL-MCNC: 9.8 MG/DL (ref 8.4–10.2)
CHLORIDE SERPL-SCNC: 103 MMOL/L (ref 96–108)
CO2 SERPL-SCNC: 27 MMOL/L (ref 21–32)
CREAT SERPL-MCNC: 1.1 MG/DL (ref 0.6–1.3)
ERYTHROCYTE [DISTWIDTH] IN BLOOD BY AUTOMATED COUNT: 15.1 % (ref 11.6–15.1)
GFR SERPL CREATININE-BSD FRML MDRD: 48 ML/MIN/1.73SQ M
GLUCOSE SERPL-MCNC: 122 MG/DL (ref 65–140)
GLUCOSE SERPL-MCNC: 136 MG/DL (ref 65–140)
GLUCOSE SERPL-MCNC: 153 MG/DL (ref 65–140)
GLUCOSE SERPL-MCNC: 172 MG/DL (ref 65–140)
HCT VFR BLD AUTO: 44.6 % (ref 34.8–46.1)
HGB BLD-MCNC: 13.8 G/DL (ref 11.5–15.4)
MCH RBC QN AUTO: 25.4 PG (ref 26.8–34.3)
MCHC RBC AUTO-ENTMCNC: 30.9 G/DL (ref 31.4–37.4)
MCV RBC AUTO: 82 FL (ref 82–98)
PLATELET # BLD AUTO: 275 THOUSANDS/UL (ref 149–390)
PMV BLD AUTO: 9.8 FL (ref 8.9–12.7)
POTASSIUM SERPL-SCNC: 4.2 MMOL/L (ref 3.5–5.3)
RBC # BLD AUTO: 5.43 MILLION/UL (ref 3.81–5.12)
SODIUM SERPL-SCNC: 137 MMOL/L (ref 135–147)
WBC # BLD AUTO: 6.54 THOUSAND/UL (ref 4.31–10.16)

## 2024-01-14 PROCEDURE — 99239 HOSP IP/OBS DSCHRG MGMT >30: CPT | Performed by: FAMILY MEDICINE

## 2024-01-14 PROCEDURE — 80048 BASIC METABOLIC PNL TOTAL CA: CPT | Performed by: FAMILY MEDICINE

## 2024-01-14 PROCEDURE — 82948 REAGENT STRIP/BLOOD GLUCOSE: CPT

## 2024-01-14 PROCEDURE — 85027 COMPLETE CBC AUTOMATED: CPT | Performed by: FAMILY MEDICINE

## 2024-01-14 PROCEDURE — 74022 RADEX COMPL AQT ABD SERIES: CPT

## 2024-01-14 RX ADMIN — POLYETHYLENE GLYCOL 3350, SODIUM SULFATE ANHYDROUS, SODIUM BICARBONATE, SODIUM CHLORIDE, POTASSIUM CHLORIDE 4000 ML: 236; 22.74; 6.74; 5.86; 2.97 POWDER, FOR SOLUTION ORAL at 14:27

## 2024-01-14 RX ADMIN — CIPROFLOXACIN 400 MG: 2 INJECTION, SOLUTION INTRAVENOUS at 03:53

## 2024-01-14 RX ADMIN — AMLODIPINE BESYLATE 5 MG: 5 TABLET ORAL at 08:35

## 2024-01-14 RX ADMIN — ENOXAPARIN SODIUM 40 MG: 40 INJECTION SUBCUTANEOUS at 08:35

## 2024-01-14 RX ADMIN — SERTRALINE 100 MG: 100 TABLET, FILM COATED ORAL at 08:35

## 2024-01-14 RX ADMIN — POLYETHYLENE GLYCOL 3350 17 G: 17 POWDER, FOR SOLUTION ORAL at 08:34

## 2024-01-14 RX ADMIN — INSULIN LISPRO 1 UNITS: 100 INJECTION, SOLUTION INTRAVENOUS; SUBCUTANEOUS at 17:00

## 2024-01-14 RX ADMIN — LOSARTAN POTASSIUM 50 MG: 50 TABLET, FILM COATED ORAL at 08:35

## 2024-01-14 RX ADMIN — DOCUSATE SODIUM 100 MG: 100 CAPSULE, LIQUID FILLED ORAL at 08:36

## 2024-01-14 RX ADMIN — DULOXETINE HYDROCHLORIDE 60 MG: 60 CAPSULE, DELAYED RELEASE ORAL at 08:35

## 2024-01-14 RX ADMIN — METRONIDAZOLE 500 MG: 500 INJECTION, SOLUTION INTRAVENOUS at 08:36

## 2024-01-14 RX ADMIN — INSULIN LISPRO 1 UNITS: 100 INJECTION, SOLUTION INTRAVENOUS; SUBCUTANEOUS at 08:00

## 2024-01-14 RX ADMIN — ONDANSETRON 4 MG: 2 INJECTION INTRAMUSCULAR; INTRAVENOUS at 10:40

## 2024-01-14 NOTE — NURSING NOTE
Reviewed discharge instructions , med rec, referral to GI, worsening s/s when to call PCP or come back to ER verbally understood

## 2024-01-14 NOTE — PLAN OF CARE
Problem: GASTROINTESTINAL - ADULT  Goal: Maintains or returns to baseline bowel function  Description: INTERVENTIONS:  - Assess bowel function  - Encourage oral fluids to ensure adequate hydration  - Administer IV fluids if ordered to ensure adequate hydration  - Administer ordered medications as needed laxative stool aids  - Encourage mobilization and activity  - Consider nutritional services referral to assist patient with adequate nutrition and appropriate food choices  Outcome: Progressing

## 2024-01-14 NOTE — ASSESSMENT & PLAN NOTE
Lab Results   Component Value Date    HGBA1C 10.0 (H) 07/14/2023       Recent Labs     01/13/24  1532 01/13/24 2013 01/14/24  0718 01/14/24  1117   POCGLU 157* 150* 153* 136         Blood Sugar Average: Last 72 hrs:  (P) [id number] took Lantus 50 units this morning as per her usual regimen and also takes oral medications including metformin and Januvia.  Start modified regimen on discharge

## 2024-01-14 NOTE — NURSING NOTE
Patient had multiple bowel movements after consuming Golytely, notified Dr. Murphy patient ready for discharge

## 2024-01-14 NOTE — PROGRESS NOTES
[facility]  Progress Note  Name: Radha May I  MRN: [id number]  Unit/Bed#: -01 I Date of Admission: 1/10/2024   Date of Service: 1/14/2024 I Hospital Day: 4    Assessment/Plan   * Acute diverticulitis  Assessment & Plan  Started having abdominal pain nausea and constipation 4 days ago initially was in the ED was told that she has a diverticulitis was discharged on antibiotics however after she went home she felt that sharp pain was still the same and she was not feeling better and returned back to the ED today.  CT abdomen pelvis today shows  Acute diverticulitis of the distal descending colon in the left lower quadrant with possible 1.2 cm intramural abscess     Patient was recently prescribed Augmentin however she did not really take that much to consider it failure yet.  Will place on Cipro and Flagyl for now IV     Discussed with patient that she will eventually need a colonoscopy to be done in 4 to 6 weeks.  This is the patient's first bout of diverticulitis.  Last colonoscopy was over 5 years ago  Patient tolerating soft diet.  Will give her MiraLAX to help her have a bowel movement today.  Once she has a bowel movement and continues to have no pain will be discharged home later today with a course of oral antibiotics to be continued for 7 more days    Type 2 diabetes mellitus with hypoglycemia without coma, with long-term current use of insulin ([facility])  Assessment & Plan  Lab Results   Component Value Date    HGBA1C 10.0 (H) 07/14/2023       Recent Labs     01/13/24  1532 01/13/24 2013 01/14/24  0718 01/14/24  1117   POCGLU 157* 150* 153* 136         Blood Sugar Average: Last 72 hrs:  (P) [id number] took Lantus 50 units this morning as per her usual regimen and also takes oral medications including metformin and Januvia.  Start modified regimen on discharge      Hyperlipidemia, mixed  Assessment & Plan  Hold statins while patient's oral intake is  poor    Essential hypertension  Assessment & Plan  Blood pressures are elevated continue Cozaar and added Norvasc               VTE Pharmacologic Prophylaxis:   Moderate Risk (Score 3-4) - Pharmacological DVT Prophylaxis Ordered: enoxaparin (Lovenox).    Mobility:   Basic Mobility Inpatient Raw Score: 23  JH-HLM Goal: 7: Walk 25 feet or more  JH-HLM Achieved: 8: Walk 250 feet ot more  HLM Goal achieved. Continue to encourage appropriate mobility.    Patient Centered Rounds: I performed bedside rounds with nursing staff today.   Discussions with Specialists or Other Care Team Provider: none    Education and Discussions with Family / Patient:     Total Time Spent on Date of Encounter in care of patient: 35 mins. This time was spent on one or more of the following: performing physical exam; counseling and coordination of care; obtaining or reviewing history; documenting in the medical record; reviewing/ordering tests, medications or procedures; communicating with other healthcare professionals and discussing with patient's family/caregivers.    Current Length of Stay: 4 day(s)  Current Patient Status: Inpatient   Certification Statement: The patient will continue to require additional inpatient hospital stay due to diverticulitis  Discharge Plan: Anticipate discharge tomorrow to home.    Code Status: Level 1 - Full Code    Subjective:   Patient states her abdominal pain has improved considerably but no bowel movement yet    Objective:     Vitals:   Temp (24hrs), Av.4 °F (36.3 °C), Min:97.3 °F (36.3 °C), Max:97.7 °F (36.5 °C)    Temp:  [97.3 °F (36.3 °C)-97.7 °F (36.5 °C)] 97.7 °F (36.5 °C)  HR:  [58-64] 58  Resp:  [16-18] 16  BP: (141-142)/(75-76) 142/75  SpO2:  [90 %-94 %] 90 %  Body mass index is 35.71 kg/m².     Input and Output Summary (last 24 hours):     Intake/Output Summary (Last 24 hours) at 2024 1336  Last data filed at 2024 0841  Gross per 24 hour   Intake 420 ml   Output --   Net 420 ml        Physical Exam:   Physical Exam  Vitals and nursing note reviewed.   Constitutional:       Appearance: Normal appearance.   HENT:      Head: Normocephalic and atraumatic.      Right Ear: External ear normal.      Left Ear: External ear normal.      Nose: Nose normal.      Mouth/Throat:      Pharynx: Oropharynx is clear.   Cardiovascular:      Rate and Rhythm: Normal rate and regular rhythm.      Heart sounds: Normal heart sounds.   Pulmonary:      Effort: Pulmonary effort is normal.      Breath sounds: Normal breath sounds.   Abdominal:      General: Bowel sounds are normal.      Palpations: Abdomen is soft.      Tenderness: There is no abdominal tenderness.   Musculoskeletal:         General: Normal range of motion.      Cervical back: Normal range of motion and neck supple.   Skin:     General: Skin is warm and dry.      Capillary Refill: Capillary refill takes less than 2 seconds.   Neurological:      General: No focal deficit present.      Mental Status: She is alert and oriented to person, place, and time.   Psychiatric:         Mood and Affect: Mood normal.            Additional Data:     Labs:  Results from last 7 days   Lab Units 01/14/24  1205 01/11/24  0449 01/10/24  1259   WBC Thousand/uL 6.54   < > 6.75   HEMOGLOBIN g/dL 13.8   < > 13.3   HEMATOCRIT % 44.6   < > 41.9   PLATELETS Thousands/uL 275   < > 257   NEUTROS PCT %  --   --  61   LYMPHS PCT %  --   --  30   MONOS PCT %  --   --  7   EOS PCT %  --   --  2    < > = values in this interval not displayed.     Results from last 7 days   Lab Units 01/14/24  1205 01/13/24  0459 01/11/24  0449   SODIUM mmol/L 137   < > 140   POTASSIUM mmol/L 4.2   < > 3.8   CHLORIDE mmol/L 103   < > 106   CO2 mmol/L 27   < > 25   BUN mg/dL 17   < > 15   CREATININE mg/dL 1.10   < > 1.01   ANION GAP mmol/L 7   < > 9   CALCIUM mg/dL 9.8   < > 9.0   ALBUMIN g/dL  --   --  3.6   TOTAL BILIRUBIN mg/dL  --   --  0.25   ALK PHOS U/L  --   --  47   ALT U/L  --   --  12   AST  U/L  --   --  17   GLUCOSE RANDOM mg/dL 122   < > 67    < > = values in this interval not displayed.         Results from last 7 days   Lab Units 01/14/24  1117 01/14/24  0718 01/13/24  2013 01/13/24  1532 01/13/24  1108 01/13/24  0719 01/12/24  2137 01/12/24  1534 01/12/24  1134 01/12/24  0726 01/11/24  2029 01/11/24  1553   POC GLUCOSE mg/dl 136 153* 150* 157* 196* 134 151* 123 176* 125 119 94         Results from last 7 days   Lab Units 01/10/24  1259 01/08/24  1420   LACTIC ACID mmol/L 1.5 1.0   PROCALCITONIN ng/ml 0.06  --        Lines/Drains:  Invasive Devices       Peripheral Intravenous Line  Duration             Peripheral IV 01/12/24 Left Arm 1 day                          Imaging: Reviewed radiology reports from this admission including: abdominal/pelvic CT    Recent Cultures (last 7 days):   Results from last 7 days   Lab Units 01/10/24  1316 01/10/24  1259 01/08/24  1633 01/08/24  1420   BLOOD CULTURE  No Growth at 72 hrs. No Growth at 72 hrs. No Growth After 5 Days. No Growth After 5 Days.       Last 24 Hours Medication List:   Current Facility-Administered Medications   Medication Dose Route Frequency Provider Last Rate    acetaminophen  650 mg Oral Q6H PRN Evette Murphy MD      amLODIPine  5 mg Oral Daily Evette Murphy MD      bisacodyl  10 mg Rectal Daily Evette Murphy MD      ciprofloxacin  400 mg Intravenous Q12H Evette Murphy  mg (01/14/24 0353)    docusate sodium  100 mg Oral BID Evette Murphy MD      DULoxetine  60 mg Oral Daily Evette Murphy MD      enoxaparin  40 mg Subcutaneous Daily Evette Murphy MD      hydrOXYzine HCL  25 mg Oral Q6H PRN Evette Murphy MD      insulin lispro  1-6 Units Subcutaneous TID AC Evette Murphy MD      insulin lispro  1-6 Units Subcutaneous HS Evette Murphy MD      losartan  50 mg Oral Daily Evette Murphy MD      metroNIDAZOLE  500 mg Intravenous Q8H Evette Murphy  mg (01/14/24 0836)    ondansetron  4 mg Intravenous Q6H PRN Evette Murphy MD      polyethylene glycol   4,000 mL Oral Once Evette Murphy MD      polyethylene glycol  17 g Oral Daily PRN Evette Murphy MD      sertraline  100 mg Oral Daily Evette Murphy MD          Today, Patient Was Seen By: Evette Murphy MD    **Please Note: This note may have been constructed using a voice recognition system.**

## 2024-01-14 NOTE — NURSING NOTE
Notified Dr Martinez that the patient has not yet had a BM and is not comfortable going home until she does. New order received for Milk of Magnesia.

## 2024-01-14 NOTE — NURSING NOTE
Received call from Dr Murphy asking if patient had a bowel movement yet. Reported that patient did not have a BM. Dr Murphy stated that the patient can be discharged without a BM and can take Miralax and Colace when she gets home. Reported same to patient and patient stated she does not feel comfortable going home without having an BM. Notified Dr Murphy and she stated to let her know if the patient doesn't 6:00PM.

## 2024-01-14 NOTE — PLAN OF CARE
Problem: PAIN - ADULT  Goal: Verbalizes/displays adequate comfort level or baseline comfort level  Description: Interventions:  - Encourage patient to monitor pain and request assistance  - Assess pain using appropriate pain scale  - Administer analgesics based on type and severity of pain and evaluate response  - Implement non-pharmacological measures as appropriate and evaluate response  - Consider cultural and social influences on pain and pain management  - Notify physician/advanced practitioner if interventions unsuccessful or patient reports new pain  Outcome: Progressing     Problem: INFECTION - ADULT  Goal: Absence or prevention of progression during hospitalization  Description: INTERVENTIONS:  - Assess and monitor for signs and symptoms of infection  - Monitor lab/diagnostic results  - Monitor all insertion sites, i.e. indwelling lines, tubes, and drains  - Monitor endotracheal if appropriate and nasal secretions for changes in amount and color  - [place] appropriate cooling/warming therapies per order  - Administer medications as ordered  - Instruct and encourage patient and family to use good hand hygiene technique  - Identify and instruct in appropriate isolation precautions for identified infection/condition  Outcome: Progressing  Goal: Absence of fever/infection during neutropenic period  Description: INTERVENTIONS:  - Monitor WBC    Outcome: Progressing     Problem: SAFETY ADULT  Goal: Patient will remain free of falls  Description: INTERVENTIONS:  - Educate patient/family on patient safety including physical limitations  - Instruct patient to call for assistance with activity   - Consult OT/PT to assist with strengthening/mobility   - Keep Call bell within reach  - Keep bed low and locked with side rails adjusted as appropriate  - Keep care items and personal belongings within reach  - Initiate and maintain comfort rounds  - Make Fall Risk Sign visible to staff  - Apply yellow socks and bracelet  for high fall risk patients  - Consider moving patient to room near nurses station  Outcome: Progressing  Goal: Maintain or return to baseline ADL function  Description: INTERVENTIONS:  -  Assess patient's ability to carry out ADLs; assess patient's baseline for ADL function and identify physical deficits which impact ability to perform ADLs (bathing, care of mouth/teeth, toileting, grooming, dressing, etc.)  - Assess/evaluate cause of self-care deficits   - Assess range of motion  - Assess patient's mobility; develop plan if impaired  - Assess patient's need for assistive devices and provide as appropriate  - Encourage maximum independence but intervene and supervise when necessary  - Involve family in performance of ADLs  - Assess for home care needs following discharge   - Consider OT consult to assist with ADL evaluation and planning for discharge  - Provide patient education as appropriate  Outcome: Progressing  Goal: Maintains/Returns to pre admission functional level  Description: INTERVENTIONS:  - Perform AM-PAC 6 Click Basic Mobility/ Daily Activity assessment daily.  - Set and communicate daily mobility goal to care team and patient/family/caregiver.   - Collaborate with rehabilitation services on mobility goals if consulted  - Perform Range of Motion 3 times a day.  - Reposition patient every 2 hours.  - Dangle patient 3 times a day  - Stand patient 3 times a day  - Ambulate patient 3 times a day  - Out of bed to chair 3 times a day   - Out of bed for meals 3 times a day  - Out of bed for toileting  - Record patient progress and toleration of activity level   Outcome: Progressing     Problem: Knowledge Deficit  Goal: Patient/family/caregiver demonstrates understanding of disease process, treatment plan, medications, and discharge instructions  Description: Complete learning assessment and assess knowledge base.  Interventions:  - Provide teaching at level of understanding  - Provide teaching via preferred  learning methods  Outcome: Progressing     Problem: DISCHARGE PLANNING  Goal: Discharge to home or other facility with appropriate resources  Description: INTERVENTIONS:  - Identify barriers to discharge w/patient and caregiver  - Arrange for needed discharge resources and transportation as appropriate  - Identify discharge learning needs (meds, wound care, etc.)  - Arrange for interpretive services to assist at discharge as needed  - Refer to Case Management Department for coordinating discharge planning if the patient needs post-hospital services based on physician/advanced practitioner order or complex needs related to functional status, cognitive ability, or social support system  Outcome: Progressing

## 2024-01-15 LAB
BACTERIA BLD CULT: NORMAL
BACTERIA BLD CULT: NORMAL

## 2024-07-24 ENCOUNTER — HOSPITAL ENCOUNTER (EMERGENCY)
Facility: HOSPITAL | Age: 79
Discharge: HOME | End: 2024-07-24
Attending: EMERGENCY MEDICINE
Payer: MEDICARE

## 2024-07-24 ENCOUNTER — APPOINTMENT (EMERGENCY)
Dept: CT IMAGING | Facility: HOSPITAL | Age: 79
End: 2024-07-24
Payer: MEDICARE

## 2024-07-24 VITALS
OXYGEN SATURATION: 95 % | WEIGHT: 170.64 LBS | HEIGHT: 61 IN | SYSTOLIC BLOOD PRESSURE: 140 MMHG | DIASTOLIC BLOOD PRESSURE: 75 MMHG | HEART RATE: 82 BPM | TEMPERATURE: 98 F | RESPIRATION RATE: 17 BRPM | BODY MASS INDEX: 32.22 KG/M2

## 2024-07-24 DIAGNOSIS — R10.2 VAGINAL PAIN: Primary | ICD-10-CM

## 2024-07-24 LAB
ALBUMIN SERPL BCG-MCNC: 4.4 G/DL (ref 3.5–5)
ALP SERPL-CCNC: 40 U/L (ref 34–104)
ALT SERPL W P-5'-P-CCNC: 31 U/L (ref 7–52)
ANION GAP SERPL CALCULATED.3IONS-SCNC: 14 MMOL/L (ref 4–13)
AST SERPL W P-5'-P-CCNC: 22 U/L (ref 13–39)
BACTERIA UR QL AUTO: NORMAL /HPF
BASOPHILS # BLD AUTO: 0.04 THOUSANDS/ÂΜL (ref 0–0.1)
BASOPHILS NFR BLD AUTO: 0 % (ref 0–1)
BILIRUB SERPL-MCNC: 0.38 MG/DL (ref 0.2–1)
BILIRUB UR QL STRIP: NEGATIVE
BUN SERPL-MCNC: 24 MG/DL (ref 5–25)
CALCIUM SERPL-MCNC: 10 MG/DL (ref 8.4–10.2)
CHLORIDE SERPL-SCNC: 102 MMOL/L (ref 96–108)
CLARITY UR: CLEAR
CO2 SERPL-SCNC: 22 MMOL/L (ref 21–32)
COLOR UR: YELLOW
CREAT SERPL-MCNC: 0.94 MG/DL (ref 0.6–1.3)
EOSINOPHIL # BLD AUTO: 0.17 THOUSAND/ÂΜL (ref 0–0.61)
EOSINOPHIL NFR BLD AUTO: 1 % (ref 0–6)
ERYTHROCYTE [DISTWIDTH] IN BLOOD BY AUTOMATED COUNT: 13.3 % (ref 11.6–15.1)
GFR SERPL CREATININE-BSD FRML MDRD: 58 ML/MIN/1.73SQ M
GLUCOSE SERPL-MCNC: 73 MG/DL (ref 65–140)
GLUCOSE SERPL-MCNC: 90 MG/DL (ref 65–140)
GLUCOSE UR STRIP-MCNC: ABNORMAL MG/DL
HCT VFR BLD AUTO: 42.8 % (ref 34.8–46.1)
HGB BLD-MCNC: 14 G/DL (ref 11.5–15.4)
HGB UR QL STRIP.AUTO: NEGATIVE
IMM GRANULOCYTES # BLD AUTO: 0.06 THOUSAND/UL (ref 0–0.2)
IMM GRANULOCYTES NFR BLD AUTO: 1 % (ref 0–2)
KETONES UR STRIP-MCNC: NEGATIVE MG/DL
LEUKOCYTE ESTERASE UR QL STRIP: ABNORMAL
LIPASE SERPL-CCNC: 24 U/L (ref 11–82)
LYMPHOCYTES # BLD AUTO: 4.68 THOUSANDS/ÂΜL (ref 0.6–4.47)
LYMPHOCYTES NFR BLD AUTO: 38 % (ref 14–44)
MCH RBC QN AUTO: 27.9 PG (ref 26.8–34.3)
MCHC RBC AUTO-ENTMCNC: 32.7 G/DL (ref 31.4–37.4)
MCV RBC AUTO: 85 FL (ref 82–98)
MONOCYTES # BLD AUTO: 0.54 THOUSAND/ÂΜL (ref 0.17–1.22)
MONOCYTES NFR BLD AUTO: 4 % (ref 4–12)
NEUTROPHILS # BLD AUTO: 6.77 THOUSANDS/ÂΜL (ref 1.85–7.62)
NEUTS SEG NFR BLD AUTO: 56 % (ref 43–75)
NITRITE UR QL STRIP: NEGATIVE
NON-SQ EPI CELLS URNS QL MICRO: NORMAL /HPF
NRBC BLD AUTO-RTO: 0 /100 WBCS
PH UR STRIP.AUTO: 7 [PH]
PLATELET # BLD AUTO: 298 THOUSANDS/UL (ref 149–390)
PMV BLD AUTO: 9.3 FL (ref 8.9–12.7)
POTASSIUM SERPL-SCNC: 4.3 MMOL/L (ref 3.5–5.3)
PROT SERPL-MCNC: 7.4 G/DL (ref 6.4–8.4)
PROT UR STRIP-MCNC: NEGATIVE MG/DL
RBC # BLD AUTO: 5.02 MILLION/UL (ref 3.81–5.12)
RBC #/AREA URNS AUTO: NORMAL /HPF
SODIUM SERPL-SCNC: 138 MMOL/L (ref 135–147)
SP GR UR STRIP.AUTO: 1.01 (ref 1–1.03)
UROBILINOGEN UR QL STRIP.AUTO: 0.2 E.U./DL
WBC # BLD AUTO: 12.26 THOUSAND/UL (ref 4.31–10.16)
WBC #/AREA URNS AUTO: NORMAL /HPF

## 2024-07-24 PROCEDURE — 96361 HYDRATE IV INFUSION ADD-ON: CPT

## 2024-07-24 PROCEDURE — 96375 TX/PRO/DX INJ NEW DRUG ADDON: CPT

## 2024-07-24 PROCEDURE — 85025 COMPLETE CBC W/AUTO DIFF WBC: CPT | Performed by: EMERGENCY MEDICINE

## 2024-07-24 PROCEDURE — 96374 THER/PROPH/DIAG INJ IV PUSH: CPT

## 2024-07-24 PROCEDURE — 74177 CT ABD & PELVIS W/CONTRAST: CPT

## 2024-07-24 PROCEDURE — 99285 EMERGENCY DEPT VISIT HI MDM: CPT | Performed by: EMERGENCY MEDICINE

## 2024-07-24 PROCEDURE — 99284 EMERGENCY DEPT VISIT MOD MDM: CPT

## 2024-07-24 PROCEDURE — 80053 COMPREHEN METABOLIC PANEL: CPT | Performed by: EMERGENCY MEDICINE

## 2024-07-24 PROCEDURE — 83690 ASSAY OF LIPASE: CPT | Performed by: EMERGENCY MEDICINE

## 2024-07-24 PROCEDURE — 81001 URINALYSIS AUTO W/SCOPE: CPT | Performed by: EMERGENCY MEDICINE

## 2024-07-24 PROCEDURE — 36415 COLL VENOUS BLD VENIPUNCTURE: CPT | Performed by: EMERGENCY MEDICINE

## 2024-07-24 PROCEDURE — 82948 REAGENT STRIP/BLOOD GLUCOSE: CPT

## 2024-07-24 RX ORDER — ONDANSETRON 2 MG/ML
4 INJECTION INTRAMUSCULAR; INTRAVENOUS ONCE
Status: DISCONTINUED | OUTPATIENT
Start: 2024-07-24 | End: 2024-07-24 | Stop reason: HOSPADM

## 2024-07-24 RX ORDER — ONDANSETRON 2 MG/ML
4 INJECTION INTRAMUSCULAR; INTRAVENOUS ONCE
Status: COMPLETED | OUTPATIENT
Start: 2024-07-24 | End: 2024-07-24

## 2024-07-24 RX ORDER — FENTANYL CITRATE 50 UG/ML
25 INJECTION, SOLUTION INTRAMUSCULAR; INTRAVENOUS ONCE
Status: COMPLETED | OUTPATIENT
Start: 2024-07-24 | End: 2024-07-24

## 2024-07-24 RX ADMIN — IOHEXOL 100 ML: 350 INJECTION, SOLUTION INTRAVENOUS at 14:57

## 2024-07-24 RX ADMIN — ONDANSETRON 4 MG: 2 INJECTION INTRAMUSCULAR; INTRAVENOUS at 14:21

## 2024-07-24 RX ADMIN — FENTANYL CITRATE 25 MCG: 50 INJECTION INTRAMUSCULAR; INTRAVENOUS at 14:21

## 2024-07-24 RX ADMIN — SODIUM CHLORIDE 1000 ML: 0.9 INJECTION, SOLUTION INTRAVENOUS at 14:18

## 2024-07-24 NOTE — ED NOTES
Patient reports feeling shaky and CGM alert of 68. POCT glucose checked at this time.     Allison A Schoener, RN  07/24/24 9816

## 2024-07-24 NOTE — DISCHARGE INSTRUCTIONS
Return to the ER immediately for any worsening symptoms. Follow up with your doctor fand gynecologist or further evaluation and management.

## 2024-07-24 NOTE — ED PROVIDER NOTES
"History  Chief Complaint   Patient presents with    Possible UTI     Patient states \"I've had a UTI for two weeks\". Given two different antibiotics by PCP. Now experiencing pain in right flank.      This is a 79-year-old female presenting to the ED for evaluation of UTI symptoms for 2 weeks.  Patient states that she was seen by her PCP and given a week of antibiotics and did not have any improvement.  She went back after the first antibiotics are completed and is now on her second week.  She states that she is currently on Macrobid day 2.  She was also given Diflucan by the OB/GYN.  After the Diflucan patient states that she used Monistat cream for several days as well as Vagisil wipes.  She describes her symptoms as dysuria, abdominal pain, burning and back pain        Prior to Admission Medications   Prescriptions Last Dose Informant Patient Reported? Taking?   Aspirin Buf,CaCarb-MgCarb-MgO, 81 MG TABS   Yes No   Sig: Take 81 mg by mouth   acetaminophen (TYLENOL) 650 mg CR tablet   Yes No   Sig: Take 500 mg by mouth every 8 (eight) hours as needed for mild pain   amLODIPine (NORVASC) 5 mg tablet   No No   Sig: Take 1 tablet (5 mg total) by mouth daily   docusate sodium (COLACE) 100 mg capsule   No No   Sig: Take 1 capsule (100 mg total) by mouth 2 (two) times a day   insulin glargine (Lantus SoloStar) 100 units/mL injection pen   Yes No   Sig: Inject 50 Units under the skin daily in the early morning   omeprazole (PriLOSEC) 10 mg delayed release capsule   Yes No   Sig: Take 20 mg by mouth daily   oxyCODONE (Roxicodone) 5 immediate release tablet   No No   Sig: Take 1 tablet (5 mg total) by mouth every 6 (six) hours as needed for severe pain for up to 15 doses Max Daily Amount: 20 mg   polyethylene glycol (MIRALAX) 17 g packet   No No   Sig: Take 17 g by mouth daily as needed (take if no bm in 2 days)   rosuvastatin (CRESTOR) 40 MG tablet   Yes No   sertraline (ZOLOFT) 100 mg tablet   Yes No   Sig: Take 100 mg by " mouth daily   valsartan (DIOVAN) 80 mg tablet  Self Yes No   Sig: Take 80 mg by mouth 2 (two) times a day      Facility-Administered Medications: None       Past Medical History:   Diagnosis Date    Coronary artery disease     Diabetes mellitus (HCC)     Hypertension     Lyme disease        Past Surgical History:   Procedure Laterality Date    CORONARY ARTERY BYPASS GRAFT      x3    FRACTURE SURGERY Left     knee    HYSTERECTOMY         Family History   Problem Relation Age of Onset    Hypertension Father      I have reviewed and agree with the history as documented.    E-Cigarette/Vaping    E-Cigarette Use Never User      E-Cigarette/Vaping Substances     Social History     Tobacco Use    Smoking status: Never    Smokeless tobacco: Never   Vaping Use    Vaping status: Never Used   Substance Use Topics    Alcohol use: Not Currently    Drug use: Not Currently       Review of Systems   Constitutional:  Negative for chills and fever.   HENT:  Negative for ear pain and sore throat.    Eyes:  Negative for pain and visual disturbance.   Respiratory:  Negative for cough and shortness of breath.    Cardiovascular:  Negative for chest pain and palpitations.   Gastrointestinal:  Positive for abdominal distention. Negative for abdominal pain and vomiting.   Genitourinary:  Positive for dysuria and flank pain. Negative for hematuria.   Musculoskeletal:  Negative for arthralgias and back pain.   Skin:  Negative for color change and rash.   Neurological:  Positive for dizziness. Negative for seizures and syncope.   All other systems reviewed and are negative.      Physical Exam  Physical Exam  Vitals and nursing note reviewed.   Constitutional:       General: She is in acute distress.      Appearance: Normal appearance. She is well-developed and normal weight.   HENT:      Head: Normocephalic and atraumatic.      Right Ear: External ear normal.      Left Ear: External ear normal.      Nose: Nose normal.      Mouth/Throat:       Mouth: Mucous membranes are moist.   Eyes:      Extraocular Movements: Extraocular movements intact.      Conjunctiva/sclera: Conjunctivae normal.   Cardiovascular:      Rate and Rhythm: Normal rate and regular rhythm.      Heart sounds: No murmur heard.  Pulmonary:      Effort: Pulmonary effort is normal. No respiratory distress.      Breath sounds: Normal breath sounds.   Abdominal:      General: Abdomen is flat. Bowel sounds are normal. There is distension.      Palpations: Abdomen is soft.      Tenderness: There is no abdominal tenderness. There is guarding.   Genitourinary:     General: Normal vulva.      Comments: Vulva appears to be normal without any erythema or lesions no noted discharge  Musculoskeletal:         General: No swelling.      Cervical back: Normal range of motion and neck supple.   Skin:     General: Skin is warm and dry.      Capillary Refill: Capillary refill takes less than 2 seconds.   Neurological:      General: No focal deficit present.      Mental Status: She is alert and oriented to person, place, and time. Mental status is at baseline.   Psychiatric:         Mood and Affect: Mood normal.         Vital Signs  ED Triage Vitals [07/24/24 1349]   Temperature Pulse Respirations Blood Pressure SpO2   98 °F (36.7 °C) 67 16 167/76 97 %      Temp Source Heart Rate Source Patient Position - Orthostatic VS BP Location FiO2 (%)   Temporal Monitor Lying Left arm --      Pain Score       8           Vitals:    07/24/24 1349 07/24/24 1530 07/24/24 1600 07/24/24 1700   BP: 167/76 136/65 119/60 140/75   Pulse: 67 79 81 82   Patient Position - Orthostatic VS: Lying Sitting Sitting Sitting         Visual Acuity      ED Medications  Medications   ondansetron (ZOFRAN) injection 4 mg (4 mg Intravenous Given 7/24/24 1421)   fentaNYL injection 25 mcg (25 mcg Intravenous Given 7/24/24 1421)   sodium chloride 0.9 % bolus 1,000 mL (0 mL Intravenous Stopped 7/24/24 1609)   iohexol (OMNIPAQUE) 350 MG/ML injection  (MULTI-DOSE) 100 mL (100 mL Intravenous Given 7/24/24 1457)       Diagnostic Studies  Results Reviewed       Procedure Component Value Units Date/Time    Urine Microscopic [[id number]]  (Normal) Collected: 07/24/24 1512    Lab Status: Final result Specimen: Urine, Clean Catch Updated: 07/24/24 1526     RBC, UA None Seen /hpf      WBC, UA None Seen /hpf      Epithelial Cells Occasional /hpf      Bacteria, UA None Seen /hpf     UA w Reflex to Microscopic w Reflex to Culture [[id number]]  (Abnormal) Collected: 07/24/24 1512    Lab Status: Final result Specimen: Urine, Clean Catch Updated: 07/24/24 1520     Color, UA Yellow     Clarity, UA Clear     Specific Gravity, UA 1.010     pH, UA 7.0     Leukocytes, UA Elevated glucose may cause decreased leukocyte values. See urine microscopic for UWBC result     Nitrite, UA Negative     Protein, UA Negative mg/dl      Glucose, UA >=1000 (1%) mg/dl      Ketones, UA Negative mg/dl      Urobilinogen, UA 0.2 E.U./dl      Bilirubin, UA Negative     Occult Blood, UA Negative    CMP [[id number]]  (Abnormal) Collected: 07/24/24 1420    Lab Status: Final result Specimen: Blood from Arm, Left Updated: 07/24/24 1445     Sodium 138 mmol/L      Potassium 4.3 mmol/L      Chloride 102 mmol/L      CO2 22 mmol/L      ANION GAP 14 mmol/L      BUN 24 mg/dL      Creatinine 0.94 mg/dL      Glucose 73 mg/dL      Calcium 10.0 mg/dL      AST 22 U/L      ALT 31 U/L      Alkaline Phosphatase 40 U/L      Total Protein 7.4 g/dL      Albumin 4.4 g/dL      Total Bilirubin 0.38 mg/dL      eGFR 58 ml/min/1.73sq m     Narrative:      National Kidney Disease Foundation guidelines for Chronic Kidney Disease (CKD):     Stage 1 with normal or high GFR (GFR > 90 mL/min/1.73 square meters)    Stage 2 Mild CKD (GFR = 60-89 mL/min/1.73 square meters)    Stage 3A Moderate CKD (GFR = 45-59 mL/min/1.73 square meters)    Stage 3B Moderate CKD (GFR = 30-44 mL/min/1.73 square meters)    Stage 4 Severe CKD (GFR = 15-29  mL/min/1.73 square meters)    Stage 5 End Stage CKD (GFR <15 mL/min/1.73 square meters)  Note: GFR calculation is accurate only with a steady state creatinine    Lipase [[id number]]  (Normal) Collected: 07/24/24 1420    Lab Status: Final result Specimen: Blood from Arm, Left Updated: 07/24/24 1445     Lipase 24 u/L     CBC and differential [[id number]]  (Abnormal) Collected: 07/24/24 1420    Lab Status: Final result Specimen: Blood from Arm, Left Updated: 07/24/24 1426     WBC 12.26 Thousand/uL      RBC 5.02 Million/uL      Hemoglobin 14.0 g/dL      Hematocrit 42.8 %      MCV 85 fL      MCH 27.9 pg      MCHC 32.7 g/dL      RDW 13.3 %      MPV 9.3 fL      Platelets 298 Thousands/uL      nRBC 0 /100 WBCs      Segmented % 56 %      Immature Grans % 1 %      Lymphocytes % 38 %      Monocytes % 4 %      Eosinophils Relative 1 %      Basophils Relative 0 %      Absolute Neutrophils 6.77 Thousands/µL      Absolute Immature Grans 0.06 Thousand/uL      Absolute Lymphocytes 4.68 Thousands/µL      Absolute Monocytes 0.54 Thousand/µL      Eosinophils Absolute 0.17 Thousand/µL      Basophils Absolute 0.04 Thousands/µL     Fingerstick Glucose (POCT) [[id number]]  (Normal) Collected: 07/24/24 1403    Lab Status: Final result Specimen: Blood Updated: 07/24/24 1404     POC Glucose 90 mg/dl                    CT abdomen pelvis with contrast   Final Result by Ilya Celestin MD (07/24 1531)      No acute abnormality in the abdomen or pelvis.         Workstation performed: [id number]                    Procedures  Procedures         ED Course  ED Course as of 07/24/24 2137 Wed Jul 24, 2024 1730 Patient had a stable ED course.  No acute findings on her workup.  She does have mild leukocytosis but no obvious UTI on the urinalysis.  I encouraged patient to complete her Macrobid prescription as prescribed and to follow-up with her  PCP.  Also gave patient outpatient GYN follow-up.  At the time of her discharge patient states that she  had no dysuria and felt much improved.                                 SBIRT 20yo+      Flowsheet Row Most Recent Value   Initial Alcohol Screen: US AUDIT-C     1. How often do you have a drink containing alcohol? 0 Filed at: 07/24/2024 1438   2. How many drinks containing alcohol do you have on a typical day you are drinking?  0 Filed at: 07/24/2024 1438   3a. Male UNDER 65: How often do you have five or more drinks on one occasion? 0 Filed at: 07/24/2024 1438   3b. FEMALE Any Age, or MALE 65+: How often do you have 4 or more drinks on one occassion? 0 Filed at: 07/24/2024 1438   Audit-C Score 0 Filed at: 07/24/2024 1438   YARELY: How many times in the past year have you...    Used an illegal drug or used a prescription medication for non-medical reasons? Never Filed at: 07/24/2024 1438                      Medical Decision Making  79-year-old female presenting to the ED for evaluation of UTI symptoms.  Differential diagnosis includes but not limited to: UTI, cystitis, pyelonephritis, candidiasis    Amount and/or Complexity of Data Reviewed  Labs: ordered.  Radiology: ordered.    Risk  Prescription drug management.                 Disposition  Final diagnoses:   Vaginal pain     Time reflects when diagnosis was documented in both MDM as applicable and the Disposition within this note       Time User Action Codes Description Comment    7/24/2024  5:41 PM Miriam Vences Add [R10.2] Vaginal pain           ED Disposition       ED Disposition   Discharge    Condition   Stable    Date/Time   Wed Jul 24, 2024 1741    Comment   Radha May discharge to home/self care.                   Follow-up Information       Follow up With Specialties Details Why Contact Info    Keyana Phan MD Internal Medicine Schedule an appointment as soon as possible for a visit   [address] [facility]  [address]  [place] [address] [phone]      Linda Shirley MD Obstetrics and Gynecology, Obstetrics, Gynecology   [address]  [facility] [address]  [phone]              Discharge Medication List as of 7/24/2024  5:42 PM        CONTINUE these medications which have NOT CHANGED    Details   acetaminophen (TYLENOL) 650 mg CR tablet Take 500 mg by mouth every 8 (eight) hours as needed for mild pain, Historical Med      amLODIPine (NORVASC) 5 mg tablet Take 1 tablet (5 mg total) by mouth daily, Starting Sun 1/14/2024, Until Tue 2/13/2024, Normal      Aspirin Buf,CaCarb-MgCarb-MgO, 81 MG TABS Take 81 mg by mouth, Historical Med      docusate sodium (COLACE) 100 mg capsule Take 1 capsule (100 mg total) by mouth 2 (two) times a day, Starting Sat 1/13/2024, Until Mon 2/12/2024, Normal      insulin glargine (Lantus SoloStar) 100 units/mL injection pen Inject 50 Units under the skin daily in the early morning, Historical Med      omeprazole (PriLOSEC) 10 mg delayed release capsule Take 20 mg by mouth daily, Historical Med      oxyCODONE (Roxicodone) 5 immediate release tablet Take 1 tablet (5 mg total) by mouth every 6 (six) hours as needed for severe pain for up to 15 doses Max Daily Amount: 20 mg, Starting Mon 1/8/2024, Normal      polyethylene glycol (MIRALAX) 17 g packet Take 17 g by mouth daily as needed (take if no bm in 2 days), Starting Sat 1/13/2024, Until Mon 2/12/2024 at 2359, Normal      rosuvastatin (CRESTOR) 40 MG tablet Starting Wed 10/24/2018, Historical Med      sertraline (ZOLOFT) 100 mg tablet Take 100 mg by mouth daily, Historical Med      valsartan (DIOVAN) 80 mg tablet Take 80 mg by mouth 2 (two) times a day, Historical Med             No discharge procedures on file.    PDMP Review       None            ED Provider  Electronically Signed by             Miriam Vences,   07/24/24 2949

## 2024-12-05 ENCOUNTER — HOSPITAL ENCOUNTER (EMERGENCY)
Facility: HOSPITAL | Age: 79
Discharge: HOME | End: 2024-12-05
Attending: EMERGENCY MEDICINE
Payer: MEDICARE

## 2024-12-05 ENCOUNTER — APPOINTMENT (EMERGENCY)
Dept: RADIOLOGY | Facility: HOSPITAL | Age: 79
End: 2024-12-05
Payer: MEDICARE

## 2024-12-05 VITALS
HEART RATE: 68 BPM | SYSTOLIC BLOOD PRESSURE: 146 MMHG | DIASTOLIC BLOOD PRESSURE: 98 MMHG | TEMPERATURE: 98.1 F | BODY MASS INDEX: 32.82 KG/M2 | WEIGHT: 173.72 LBS | OXYGEN SATURATION: 97 % | RESPIRATION RATE: 16 BRPM

## 2024-12-05 DIAGNOSIS — R05.9 COUGH: Primary | ICD-10-CM

## 2024-12-05 LAB
2HR DELTA HS TROPONIN: -1 NG/L
ALBUMIN SERPL BCG-MCNC: 4.1 G/DL (ref 3.5–5)
ALP SERPL-CCNC: 32 U/L (ref 34–104)
ALT SERPL W P-5'-P-CCNC: 23 U/L (ref 7–52)
ANION GAP SERPL CALCULATED.3IONS-SCNC: 9 MMOL/L (ref 4–13)
AST SERPL W P-5'-P-CCNC: 15 U/L (ref 13–39)
ATRIAL RATE: 64 BPM
B PARAPERT DNA UPPER RESP QL NAA+PROBE: NOT DETECTED
B PERT DNA UPPER RESP QL NAA+PROBE: NOT DETECTED
BASOPHILS # BLD AUTO: 0.03 THOUSANDS/ΜL (ref 0–0.1)
BASOPHILS NFR BLD AUTO: 0 % (ref 0–1)
BILIRUB SERPL-MCNC: 0.38 MG/DL (ref 0.2–1)
BUN SERPL-MCNC: 22 MG/DL (ref 5–25)
CALCIUM SERPL-MCNC: 9.5 MG/DL (ref 8.4–10.2)
CARDIAC TROPONIN I PNL SERPL HS: 6 NG/L (ref ?–50)
CARDIAC TROPONIN I PNL SERPL HS: 7 NG/L (ref ?–50)
CHLORIDE SERPL-SCNC: 103 MMOL/L (ref 96–108)
CO2 SERPL-SCNC: 24 MMOL/L (ref 21–32)
CREAT SERPL-MCNC: 0.97 MG/DL (ref 0.6–1.3)
EOSINOPHIL # BLD AUTO: 0.17 THOUSAND/ΜL (ref 0–0.61)
EOSINOPHIL NFR BLD AUTO: 2 % (ref 0–6)
ERYTHROCYTE [DISTWIDTH] IN BLOOD BY AUTOMATED COUNT: 12.7 % (ref 11.6–15.1)
FLUAV RNA RESP QL NAA+PROBE: NEGATIVE
FLUBV RNA RESP QL NAA+PROBE: NEGATIVE
GFR SERPL CREATININE-BSD FRML MDRD: 56 ML/MIN/1.73SQ M
GLUCOSE SERPL-MCNC: 156 MG/DL (ref 65–140)
HCT VFR BLD AUTO: 44 % (ref 34.8–46.1)
HGB BLD-MCNC: 14.4 G/DL (ref 11.5–15.4)
IMM GRANULOCYTES # BLD AUTO: 0.06 THOUSAND/UL (ref 0–0.2)
IMM GRANULOCYTES NFR BLD AUTO: 1 % (ref 0–2)
LACTATE SERPL-SCNC: 1.7 MMOL/L (ref 0.5–2)
LACTATE SERPL-SCNC: 2.7 MMOL/L (ref 0.5–2)
LYMPHOCYTES # BLD AUTO: 3.84 THOUSANDS/ΜL (ref 0.6–4.47)
LYMPHOCYTES NFR BLD AUTO: 39 % (ref 14–44)
MCH RBC QN AUTO: 28.1 PG (ref 26.8–34.3)
MCHC RBC AUTO-ENTMCNC: 32.7 G/DL (ref 31.4–37.4)
MCV RBC AUTO: 86 FL (ref 82–98)
MONOCYTES # BLD AUTO: 0.6 THOUSAND/ΜL (ref 0.17–1.22)
MONOCYTES NFR BLD AUTO: 6 % (ref 4–12)
NEUTROPHILS # BLD AUTO: 5.25 THOUSANDS/ΜL (ref 1.85–7.62)
NEUTS SEG NFR BLD AUTO: 52 % (ref 43–75)
NRBC BLD AUTO-RTO: 0 /100 WBCS
P AXIS: 21 DEGREES
PLATELET # BLD AUTO: 263 THOUSANDS/UL (ref 149–390)
PMV BLD AUTO: 9.1 FL (ref 8.9–12.7)
POTASSIUM SERPL-SCNC: 4.1 MMOL/L (ref 3.5–5.3)
PR INTERVAL: 154 MS
PROT SERPL-MCNC: 6.7 G/DL (ref 6.4–8.4)
QRS AXIS: -53 DEGREES
QRSD INTERVAL: 126 MS
QT INTERVAL: 474 MS
QTC INTERVAL: 489 MS
RBC # BLD AUTO: 5.12 MILLION/UL (ref 3.81–5.12)
RSV RNA RESP QL NAA+PROBE: NEGATIVE
SARS-COV-2 RNA RESP QL NAA+PROBE: NEGATIVE
SODIUM SERPL-SCNC: 136 MMOL/L (ref 135–147)
T WAVE AXIS: 33 DEGREES
VENTRICULAR RATE: 64 BPM
WBC # BLD AUTO: 9.95 THOUSAND/UL (ref 4.31–10.16)

## 2024-12-05 PROCEDURE — 93005 ELECTROCARDIOGRAM TRACING: CPT

## 2024-12-05 PROCEDURE — 84484 ASSAY OF TROPONIN QUANT: CPT | Performed by: EMERGENCY MEDICINE

## 2024-12-05 PROCEDURE — 80053 COMPREHEN METABOLIC PANEL: CPT | Performed by: EMERGENCY MEDICINE

## 2024-12-05 PROCEDURE — 71046 X-RAY EXAM CHEST 2 VIEWS: CPT

## 2024-12-05 PROCEDURE — 99285 EMERGENCY DEPT VISIT HI MDM: CPT

## 2024-12-05 PROCEDURE — 96360 HYDRATION IV INFUSION INIT: CPT

## 2024-12-05 PROCEDURE — 93010 ELECTROCARDIOGRAM REPORT: CPT | Performed by: INTERNAL MEDICINE

## 2024-12-05 PROCEDURE — 0241U HB NFCT DS VIR RESP RNA 4 TRGT: CPT | Performed by: EMERGENCY MEDICINE

## 2024-12-05 PROCEDURE — 86615 BORDETELLA ANTIBODY: CPT | Performed by: EMERGENCY MEDICINE

## 2024-12-05 PROCEDURE — 83605 ASSAY OF LACTIC ACID: CPT | Performed by: EMERGENCY MEDICINE

## 2024-12-05 PROCEDURE — 99284 EMERGENCY DEPT VISIT MOD MDM: CPT | Performed by: EMERGENCY MEDICINE

## 2024-12-05 PROCEDURE — 36415 COLL VENOUS BLD VENIPUNCTURE: CPT | Performed by: EMERGENCY MEDICINE

## 2024-12-05 PROCEDURE — 87798 DETECT AGENT NOS DNA AMP: CPT | Performed by: EMERGENCY MEDICINE

## 2024-12-05 PROCEDURE — 85025 COMPLETE CBC W/AUTO DIFF WBC: CPT | Performed by: EMERGENCY MEDICINE

## 2024-12-05 RX ORDER — IBUPROFEN 400 MG/1
400 TABLET, FILM COATED ORAL ONCE
Status: COMPLETED | OUTPATIENT
Start: 2024-12-05 | End: 2024-12-05

## 2024-12-05 RX ORDER — IPRATROPIUM BROMIDE AND ALBUTEROL SULFATE 2.5; .5 MG/3ML; MG/3ML
3 SOLUTION RESPIRATORY (INHALATION) 4 TIMES DAILY
Qty: 120 ML | Refills: 0 | Status: SHIPPED | OUTPATIENT
Start: 2024-12-05 | End: 2024-12-05

## 2024-12-05 RX ORDER — IPRATROPIUM BROMIDE AND ALBUTEROL SULFATE 2.5; .5 MG/3ML; MG/3ML
3 SOLUTION RESPIRATORY (INHALATION) 4 TIMES DAILY
Qty: 120 ML | Refills: 0 | Status: SHIPPED | OUTPATIENT
Start: 2024-12-05 | End: 2024-12-15

## 2024-12-05 RX ORDER — IPRATROPIUM BROMIDE AND ALBUTEROL SULFATE 2.5; .5 MG/3ML; MG/3ML
3 SOLUTION RESPIRATORY (INHALATION) ONCE
Status: COMPLETED | OUTPATIENT
Start: 2024-12-05 | End: 2024-12-05

## 2024-12-05 RX ORDER — ACETAMINOPHEN 325 MG/1
975 TABLET ORAL ONCE
Status: COMPLETED | OUTPATIENT
Start: 2024-12-05 | End: 2024-12-05

## 2024-12-05 RX ADMIN — SODIUM CHLORIDE 1000 ML: 0.9 INJECTION, SOLUTION INTRAVENOUS at 12:47

## 2024-12-05 RX ADMIN — IPRATROPIUM BROMIDE AND ALBUTEROL SULFATE 3 ML: .5; 3 SOLUTION RESPIRATORY (INHALATION) at 11:36

## 2024-12-05 RX ADMIN — ACETAMINOPHEN 325MG 975 MG: 325 TABLET ORAL at 11:36

## 2024-12-05 RX ADMIN — IBUPROFEN 400 MG: 400 TABLET, FILM COATED ORAL at 11:36

## 2024-12-05 NOTE — DISCHARGE INSTRUCTIONS
Lab tests pending for possible whooping cough  Please inform your clinician of visit today and arrange follow-up within the next week  Return immediately if worse or any new symptoms  Tylenol 1000 mg every 6 hours as needed  and/or  Advil 400 mg every 6 hours as needed  May take both together

## 2024-12-05 NOTE — ED PROVIDER NOTES
Time reflects when diagnosis was documented in both MDM as applicable and the Disposition within this note       Time User Action Codes Description Comment    12/5/2024  2:35 PM Sheldon Crawford Add [R05.9] Cough           ED Disposition       ED Disposition   Discharge    Condition   Stable    Date/Time   u Dec 5, 2024  2:35 PM    Comment   Radhaeileen May discharge to home/self care.                   Assessment & Plan       Medical Decision Making  This patient presents with cough.   Diagnostic considerations include pneumonia, exacerbation asthma or COPD, RSV, open cough. See ED Course.       Amount and/or Complexity of Data Reviewed  Labs: ordered. Decision-making details documented in ED Course.  Radiology: ordered and independent interpretation performed. Decision-making details documented in ED Course.  ECG/medicine tests: ordered and independent interpretation performed. Decision-making details documented in ED Course.    Risk  OTC drugs.  Prescription drug management.        ED Course as of 12/05/24 1441   Thu Dec 05, 2024   1151 WBC: 9.95   1211 EKG 11:31 AM normal sinus rhythm rate 64 left axis intraventricular conduction delay likely right bundle T wave inversion V1 lead III no ST elevation or depression   1212 LACTIC ACID(!): 2.7   1212 hs TnI 0hr: 7   1212 GLUCOSE(!): 156   1212 GFR, Calculated: 56   1255 XR chest 2 views  Questionable left basilar density   1408 XR chest 2 views  Negative per radiology   1431 LACTIC ACID: 1.7   1437 Sleeping, wakes easily, notes breathing treatment helped, stable for outpatient f/u with pmd, will return if worse       Medications   acetaminophen (TYLENOL) tablet 975 mg (975 mg Oral Given 12/5/24 1136)   ibuprofen (MOTRIN) tablet 400 mg (400 mg Oral Given 12/5/24 1136)   ipratropium-albuterol (DUO-NEB) 0.5-2.5 mg/3 mL inhalation solution 3 mL (3 mL Nebulization Given 12/5/24 1136)   sodium chloride 0.9 % bolus 1,000 mL (0 mL Intravenous Stopped 12/5/24 1347)       ED  Risk Strat Scores                           SBIRT 20yo+      Flowsheet Row Most Recent Value   Initial Alcohol Screen: US AUDIT-C     1. How often do you have a drink containing alcohol? 0 Filed at: 12/05/2024 1107   2. How many drinks containing alcohol do you have on a typical day you are drinking?  0 Filed at: 12/05/2024 1107   3b. FEMALE Any Age, or MALE 65+: How often do you have 4 or more drinks on one occassion? 0 Filed at: 12/05/2024 1107   Audit-C Score 0 Filed at: 12/05/2024 1107   YARELY: How many times in the past year have you...    Used an illegal drug or used a prescription medication for non-medical reasons? Never Filed at: 12/05/2024 1107                            History of Present Illness       Chief Complaint   Patient presents with    Cough     For 3 weeks, seen PCP and not having any relief, ABX, tessalon pearls, cough syrup with codeine, and steroids. Also states pain in the ches       Past Medical History:   Diagnosis Date    Coronary artery disease     Diabetes mellitus (HCC)     Hypertension     Lyme disease       Past Surgical History:   Procedure Laterality Date    CORONARY ARTERY BYPASS GRAFT      x3    FRACTURE SURGERY Left     knee    HYSTERECTOMY        Family History   Problem Relation Age of Onset    Hypertension Father       Social History     Tobacco Use    Smoking status: Never    Smokeless tobacco: Never   Vaping Use    Vaping status: Never Used   Substance Use Topics    Alcohol use: Not Currently    Drug use: Not Currently      E-Cigarette/Vaping    E-Cigarette Use Never User       E-Cigarette/Vaping Substances      I have reviewed and agree with the history as documented.     79-year-old female accompanied by male friend describes persistent, worsening cough over the past 3 weeks with nonbloody, greenish sputum.  No fever or chills.  Notes chest and back are achy mostly with coughing.  States primary clinician prescribed 2 courses of antibiotics without improvement, not  improved with steroids or an inhaler-poorly tolerated secondary to making her feel sicker, sleeping worse.      History provided by:  Patient  Cough  Cough characteristics:  Productive  Severity:  Severe  Onset quality:  Gradual  Timing:  Constant  Progression:  Worsening  Chronicity:  New  Context: upper respiratory infection    Relieved by:  Nothing  Worsened by:  Nothing  Associated symptoms: no fever and no shortness of breath        Review of Systems   Constitutional:  Negative for fever.   Respiratory:  Positive for cough. Negative for shortness of breath.    All other systems reviewed and are negative.          Objective       ED Triage Vitals   Temperature Pulse Blood Pressure Respirations SpO2 Patient Position - Orthostatic VS   12/05/24 1105 12/05/24 1105 12/05/24 1107 12/05/24 1105 12/05/24 1105 12/05/24 1105   98.1 °F (36.7 °C) 67 (!) 172/88 18 100 % Sitting      Temp Source Heart Rate Source BP Location FiO2 (%) Pain Score    12/05/24 1105 12/05/24 1105 12/05/24 1105 -- 12/05/24 1105    Temporal Monitor Right arm  4      Vitals      Date and Time Temp Pulse SpO2 Resp BP Pain Score FACES Pain Rating User   12/05/24 1400 -- 71 94 % 16 153/70 -- -- SL   12/05/24 1300 -- 66 91 % 16 164/73 -- --    12/05/24 1200 -- 62 96 % 16 160/74 -- -- SL   12/05/24 1136 -- -- -- -- -- 4 -- SL   12/05/24 1107 -- -- -- -- 172/88 -- -- BF   12/05/24 1105 98.1 °F (36.7 °C) 67 100 % 18 -- 4 -- BF            Physical Exam  Vitals and nursing note reviewed.   Constitutional:       General: She is not in acute distress.     Appearance: Normal appearance.      Comments: Pleasant, uncomfortable appearing, conversational, articulate, frequently coughing   HENT:      Head: Normocephalic and atraumatic.      Right Ear: External ear normal.      Left Ear: External ear normal.      Nose: Nose normal.      Mouth/Throat:      Mouth: Mucous membranes are moist.   Eyes:      Extraocular Movements: Extraocular movements intact.       Pupils: Pupils are equal, round, and reactive to light.   Cardiovascular:      Rate and Rhythm: Normal rate and regular rhythm.      Heart sounds: No murmur heard.  Pulmonary:      Effort: Pulmonary effort is normal. No respiratory distress.      Breath sounds: Rhonchi present. No wheezing or rales.   Chest:      Chest wall: No tenderness.   Abdominal:      General: Abdomen is flat. Bowel sounds are normal. There is no distension.      Tenderness: There is no abdominal tenderness. There is no guarding or rebound.   Musculoskeletal:      Cervical back: Neck supple.      Right lower leg: No edema.      Left lower leg: No edema.   Skin:     General: Skin is warm and dry.   Neurological:      General: No focal deficit present.      Mental Status: She is alert and oriented to person, place, and time.      Cranial Nerves: No cranial nerve deficit.      Sensory: No sensory deficit.      Motor: No weakness.      Coordination: Coordination normal.   Psychiatric:         Mood and Affect: Mood normal.         Behavior: Behavior normal.         Results Reviewed       Procedure Component Value Units Date/Time    Lactic acid 2 Hours [[id number]]  (Normal) Collected: 12/05/24 1404    Lab Status: Final result Specimen: Blood from Hand, Right Updated: 12/05/24 1429     LACTIC ACID 1.7 mmol/L     Narrative:      Result may be elevated if tourniquet was used during collection.    HS Troponin I 2hr [[id number]]  (Normal) Collected: 12/05/24 1313    Lab Status: Final result Specimen: Blood from Arm, Right Updated: 12/05/24 1356     hs TnI 2hr 6 ng/L      Delta 2hr hsTnI -1 ng/L     Bordetella pertussis antibody [[id number]] Collected: 12/05/24 1313    Lab Status: In process Specimen: Blood from Arm, Right Updated: 12/05/24 1323    Bordetella pertussis / parapertussis PCR [[id number]] Collected: 12/05/24 1310    Lab Status: In process Specimen: Nasopharyngeal Updated: 12/05/24 1312    Narrative:      The following orders were created for  panel order Bordetella pertussis / parapertussis PCR.  Procedure                               Abnormality         Status                     ---------                               -----------         ------                     Bordetella pertussis / p...[[id number]]                      In process                   Please view results for these tests on the individual orders.    Bordetella pertussis / parapertussis PCR [[id number]] Collected: 12/05/24 1310    Lab Status: In process Specimen: Nasopharyngeal Updated: 12/05/24 1312    FLU/RSV/COVID - if FLU/RSV clinically relevant (2hr TAT) [[id number]]  (Normal) Collected: 12/05/24 1132    Lab Status: Final result Specimen: Nares from Nose Updated: 12/05/24 1220     SARS-CoV-2 Negative     INFLUENZA A PCR Negative     INFLUENZA B PCR Negative     RSV PCR Negative    Narrative:      This test has been performed using the CoV-2/Flu/RSV plus assay on the [employer] platform. This test has been validated by the  and verified by the performing laboratory.     This test is designed to amplify and detect the following: nucleocapsid (N), envelope (E), and RNA-dependent RNA polymerase (RdRP) genes of the SARS-CoV-2 genome; matrix (M), basic polymerase (PB2), and acidic protein (PA) segments of the influenza A genome; matrix (M) and non-structural protein (NS) segments of the influenza B genome, and the nucleocapsid genes of RSV A and RSV B.     Positive results are indicative of the presence of Flu A, Flu B, RSV, and/or SARS-CoV-2 RNA. Positive results for SARS-CoV-2 or suspected novel influenza should be reported to state, local, or federal health departments according to local reporting requirements.      All results should be assessed in conjunction with clinical presentation and other laboratory markers for clinical management.     FOR PEDIATRIC PATIENTS - copy/paste COVID Guidelines URL to browser:  [email or web address]       HS Troponin 0hr (reflex protocol) [[id number]]  (Normal) Collected: 12/05/24 1132    Lab Status: Final result Specimen: Blood from Arm, Right Updated: 12/05/24 1206     hs TnI 0hr 7 ng/L     HS Troponin I 4hr [[id number]]     Lab Status: No result Specimen: Blood     Comprehensive metabolic panel [[id number]]  (Abnormal) Collected: 12/05/24 1132    Lab Status: Final result Specimen: Blood from Arm, Right Updated: 12/05/24 1158     Sodium 136 mmol/L      Potassium 4.1 mmol/L      Chloride 103 mmol/L      CO2 24 mmol/L      ANION GAP 9 mmol/L      BUN 22 mg/dL      Creatinine 0.97 mg/dL      Glucose 156 mg/dL      Calcium 9.5 mg/dL      AST 15 U/L      ALT 23 U/L      Alkaline Phosphatase 32 U/L      Total Protein 6.7 g/dL      Albumin 4.1 g/dL      Total Bilirubin 0.38 mg/dL      eGFR 56 ml/min/1.73sq m     Narrative:      National Kidney Disease Foundation guidelines for Chronic Kidney Disease (CKD):     Stage 1 with normal or high GFR (GFR > 90 mL/min/1.73 square meters)    Stage 2 Mild CKD (GFR = 60-89 mL/min/1.73 square meters)    Stage 3A Moderate CKD (GFR = 45-59 mL/min/1.73 square meters)    Stage 3B Moderate CKD (GFR = 30-44 mL/min/1.73 square meters)    Stage 4 Severe CKD (GFR = 15-29 mL/min/1.73 square meters)    Stage 5 End Stage CKD (GFR <15 mL/min/1.73 square meters)  Note: GFR calculation is accurate only with a steady state creatinine    Lactic acid, plasma (w/reflex if result > 2.0) [[id number]]  (Abnormal) Collected: 12/05/24 1132    Lab Status: Final result Specimen: Blood from Arm, Right Updated: 12/05/24 1158     LACTIC ACID 2.7 mmol/L     Narrative:      Result may be elevated if tourniquet was used during collection.    CBC and differential [[id number]] Collected: 12/05/24 1132    Lab Status: Final result Specimen: Blood from Arm, Right Updated: 12/05/24 1143     WBC 9.95 Thousand/uL      RBC 5.12 Million/uL       Hemoglobin 14.4 g/dL      Hematocrit 44.0 %      MCV 86 fL      MCH 28.1 pg      MCHC 32.7 g/dL      RDW 12.7 %      MPV 9.1 fL      Platelets 263 Thousands/uL      nRBC 0 /100 WBCs      Segmented % 52 %      Immature Grans % 1 %      Lymphocytes % 39 %      Monocytes % 6 %      Eosinophils Relative 2 %      Basophils Relative 0 %      Absolute Neutrophils 5.25 Thousands/µL      Absolute Immature Grans 0.06 Thousand/uL      Absolute Lymphocytes 3.84 Thousands/µL      Absolute Monocytes 0.60 Thousand/µL      Eosinophils Absolute 0.17 Thousand/µL      Basophils Absolute 0.03 Thousands/µL             XR chest 2 views   Final Interpretation by Harmeet Mcintosh MD (12/05 1345)      No acute cardiopulmonary disease.            Workstation performed: [id number]             Procedures    ED Medication and Procedure Management   Prior to Admission Medications   Prescriptions Last Dose Informant Patient Reported? Taking?   Aspirin Buf,CaCarb-MgCarb-MgO, 81 MG TABS   Yes No   Sig: Take 81 mg by mouth   acetaminophen (TYLENOL) 650 mg CR tablet   Yes No   Sig: Take 500 mg by mouth every 8 (eight) hours as needed for mild pain   amLODIPine (NORVASC) 5 mg tablet   No No   Sig: Take 1 tablet (5 mg total) by mouth daily   docusate sodium (COLACE) 100 mg capsule   No No   Sig: Take 1 capsule (100 mg total) by mouth 2 (two) times a day   insulin glargine (Lantus SoloStar) 100 units/mL injection pen   Yes No   Sig: Inject 50 Units under the skin daily in the early morning   omeprazole (PriLOSEC) 10 mg delayed release capsule   Yes No   Sig: Take 20 mg by mouth daily   oxyCODONE (Roxicodone) 5 immediate release tablet   No No   Sig: Take 1 tablet (5 mg total) by mouth every 6 (six) hours as needed for severe pain for up to 15 doses Max Daily Amount: 20 mg   polyethylene glycol (MIRALAX) 17 g packet   No No   Sig: Take 17 g by mouth daily as needed (take if no bm in 2 days)   rosuvastatin (CRESTOR) 40 MG tablet   Yes No   sertraline  (ZOLOFT) 100 mg tablet   Yes No   Sig: Take 100 mg by mouth daily   valsartan (DIOVAN) 80 mg tablet  Self Yes No   Sig: Take 80 mg by mouth 2 (two) times a day      Facility-Administered Medications: None     Patient's Medications   Discharge Prescriptions    IPRATROPIUM-ALBUTEROL (DUO-NEB) 0.5-2.5 MG/3 ML NEBULIZER SOLUTION    Take 3 mL by nebulization 4 (four) times a day for 10 days       Start Date: 12/5/2024 End Date: 12/15/2024       Order Dose: 3 mL       Quantity: 120 mL    Refills: 0     No discharge procedures on file.  ED SEPSIS DOCUMENTATION   Time reflects when diagnosis was documented in both MDM as applicable and the Disposition within this note       Time User Action Codes Description Comment    12/5/2024  2:35 PM Sheldon Crawford Add [R05.9] Cough                  Sheldon Crawford DO  12/05/24 1441

## 2024-12-07 LAB
B PERT IGA SER QL IA: <1 INDEX (ref 0–0.9)
B PERT IGG SER-ACNC: 2.9 INDEX (ref 0–0.94)
B PERT IGM SER QL IA: 1.1 INDEX (ref 0–0.9)

## 2025-01-04 PROBLEM — R05.9 COUGH: Status: RESOLVED | Noted: 2024-12-05 | Resolved: 2025-01-04

## 2025-01-10 ENCOUNTER — OFFICE VISIT (OUTPATIENT)
Dept: URGENT CARE | Facility: CLINIC | Age: 80
End: 2025-01-10
Payer: MEDICARE

## 2025-01-10 VITALS
DIASTOLIC BLOOD PRESSURE: 80 MMHG | BODY MASS INDEX: 32.66 KG/M2 | HEIGHT: 61 IN | HEART RATE: 83 BPM | OXYGEN SATURATION: 95 % | WEIGHT: 173 LBS | SYSTOLIC BLOOD PRESSURE: 138 MMHG | TEMPERATURE: 99.6 F | RESPIRATION RATE: 20 BRPM

## 2025-01-10 DIAGNOSIS — J32.9 SINOBRONCHITIS: Primary | ICD-10-CM

## 2025-01-10 DIAGNOSIS — J40 SINOBRONCHITIS: Primary | ICD-10-CM

## 2025-01-10 PROCEDURE — 99213 OFFICE O/P EST LOW 20 MIN: CPT

## 2025-01-10 PROCEDURE — G0463 HOSPITAL OUTPT CLINIC VISIT: HCPCS

## 2025-01-10 RX ORDER — AMOXICILLIN 500 MG/1
500 CAPSULE ORAL EVERY 12 HOURS SCHEDULED
Qty: 20 CAPSULE | Refills: 0 | Status: SHIPPED | OUTPATIENT
Start: 2025-01-10 | End: 2025-01-20

## 2025-01-10 RX ORDER — DULAGLUTIDE 0.75 MG/.5ML
INJECTION, SOLUTION SUBCUTANEOUS
COMMUNITY

## 2025-01-10 RX ORDER — DULOXETIN HYDROCHLORIDE 60 MG/1
60 CAPSULE, DELAYED RELEASE ORAL DAILY
COMMUNITY

## 2025-01-10 RX ORDER — DIMENHYDRINATE 50 MG
100 TABLET ORAL DAILY
COMMUNITY

## 2025-01-10 RX ORDER — EMPAGLIFLOZIN 25 MG/1
TABLET, FILM COATED ORAL
COMMUNITY

## 2025-01-10 RX ORDER — BENZONATATE 100 MG/1
100 CAPSULE ORAL 3 TIMES DAILY PRN
Qty: 20 CAPSULE | Refills: 0 | Status: SHIPPED | OUTPATIENT
Start: 2025-01-10

## 2025-01-10 RX ORDER — BUDESONIDE, GLYCOPYRROLATE, AND FORMOTEROL FUMARATE 160; 9; 4.8 UG/1; UG/1; UG/1
AEROSOL, METERED RESPIRATORY (INHALATION)
COMMUNITY
Start: 2024-12-03

## 2025-01-10 RX ORDER — CLOPIDOGREL BISULFATE 75 MG/1
75 TABLET ORAL EVERY MORNING
COMMUNITY

## 2025-01-10 RX ORDER — VERAPAMIL HYDROCHLORIDE 120 MG/1
120 CAPSULE, EXTENDED RELEASE ORAL DAILY
COMMUNITY
Start: 2024-08-30

## 2025-01-10 NOTE — PATIENT INSTRUCTIONS
Take antibiotic as prescribed  Take Tessalon as needed for cough  Saline spray   Cool mist humidifier   Plenty of fluids  Can use honey   Cool mist humidifier   Warm gargle with salt water for sore throat   Use Tylenol as needed for fever or pain    Follow up with PCP in 3-5 days.  Proceed to  ER if symptoms worsen.    If tests are performed, our office will contact you with results only if changes need to made to the care plan discussed with you at the visit. You can review your full results on [facility] My[facility].

## 2025-01-10 NOTE — PROGRESS NOTES
[facility]        NAME: Radha May is a 79 y.o. female  : 1945    MRN: [id number]  DATE: January 10, 2025  TIME: 3:24 PM    Assessment and Plan   Sinobronchitis [J32.9, J40]  1. Sinobronchitis  amoxicillin (AMOXIL) 500 mg capsule    benzonatate (TESSALON PERLES) 100 mg capsule            Patient Instructions     Take antibiotic as prescribed  Take Tessalon as needed for cough  Saline spray   Cool mist humidifier   Plenty of fluids  Can use honey   Cool mist humidifier   Warm gargle with salt water for sore throat   Use Tylenol as needed for fever or pain    Follow up with PCP in 3-5 days.  Proceed to  ER if symptoms worsen.    If tests are performed, our office will contact you with results only if changes need to made to the care plan discussed with you at the visit. You can review your full results on [facility].    Chief Complaint     Chief Complaint   Patient presents with    Cough     Nasal congestion and cough, body aches, ears blocked X 5 days. Dx with pertussis          History of Present Illness       Cough  This is a new problem. Episode onset: 5 days. The cough is Productive of sputum. Associated symptoms include ear pain (blocked feeling), a fever (low grade), myalgias and shortness of breath. Pertinent negatives include no chest pain, chills, postnasal drip, rhinorrhea, sore throat or wheezing.   She was was recently seen for pertussis on 24.     Review of Systems   Review of Systems   Constitutional:  Positive for fever (low grade). Negative for chills, diaphoresis and fatigue.   HENT:  Positive for congestion, ear pain (blocked feeling) and sinus pressure. Negative for postnasal drip, rhinorrhea, sore throat and trouble swallowing.    Respiratory:  Positive for cough (productive) and shortness of breath. Negative for chest tightness and wheezing.    Cardiovascular:  Negative for chest pain and palpitations.   Musculoskeletal:  Positive for myalgias.   Skin:   Negative for color change.   Neurological:  Negative for dizziness and light-headedness.   Psychiatric/Behavioral:  Negative for sleep disturbance.          Current Medications       Current Outpatient Medications:     acetaminophen (TYLENOL) 650 mg CR tablet, Take 500 mg by mouth every 8 (eight) hours as needed for mild pain, Disp: , Rfl:     amLODIPine (NORVASC) 5 mg tablet, Take 1 tablet (5 mg total) by mouth daily, Disp: 30 tablet, Rfl: 0    amoxicillin (AMOXIL) 500 mg capsule, Take 1 capsule (500 mg total) by mouth every 12 (twelve) hours for 10 days, Disp: 20 capsule, Rfl: 0    Aspirin Buf,CaCarb-MgCarb-MgO, 81 MG TABS, Take 81 mg by mouth, Disp: , Rfl:     benzonatate (TESSALON PERLES) 100 mg capsule, Take 1 capsule (100 mg total) by mouth 3 (three) times a day as needed for cough, Disp: 20 capsule, Rfl: 0    clopidogrel (PLAVIX) 75 mg tablet, 75 mg every morning, Disp: , Rfl:     denosumab (PROLIA) 60 mg/mL, Inject 60 mg under the skin, Disp: , Rfl:     insulin glargine (Lantus SoloStar) 100 units/mL injection pen, Inject 50 Units under the skin daily in the early morning, Disp: , Rfl:     Jardiance 25 MG TABS, TAKE 1 TABLET EVERY MORNING FOR GLUCOSE CONTROL, Disp: , Rfl:     omeprazole (PriLOSEC) 10 mg delayed release capsule, Take 20 mg by mouth daily, Disp: , Rfl:     rosuvastatin (CRESTOR) 40 MG tablet, , Disp: , Rfl:     sertraline (ZOLOFT) 100 mg tablet, Take 100 mg by mouth daily, Disp: , Rfl:     valsartan (DIOVAN) 80 mg tablet, Take 80 mg by mouth 2 (two) times a day, Disp: , Rfl:     verapamil (Verelan) 120 MG 24 hr capsule, Take 120 mg by mouth daily, Disp: , Rfl:     Breztri Aerosphere 160-9-4.8 MCG/ACT AERO, take 1 puff by mouth twice a day (Patient not taking: Reported on 1/10/2025), Disp: , Rfl:     coenzyme Q-10 100 MG capsule, Take 100 mg by mouth daily, Disp: , Rfl:     docusate sodium (COLACE) 100 mg capsule, Take 1 capsule (100 mg total) by mouth 2 (two) times a day (Patient not taking:  "Reported on 1/10/2025), Disp: 60 capsule, Rfl: 0    Dulaglutide (Trulicity) 0.75 MG/0.5ML SOAJ, , Disp: , Rfl:     DULoxetine (CYMBALTA) 60 mg delayed release capsule, Take 60 mg by mouth daily, Disp: , Rfl:     oxyCODONE (Roxicodone) 5 immediate release tablet, Take 1 tablet (5 mg total) by mouth every 6 (six) hours as needed for severe pain for up to 15 doses Max Daily Amount: 20 mg (Patient not taking: Reported on 1/10/2025), Disp: 15 tablet, Rfl: 0    polyethylene glycol (MIRALAX) 17 g packet, Take 17 g by mouth daily as needed (take if no bm in 2 days) (Patient not taking: Reported on 1/10/2025), Disp: 1 each, Rfl: 0    Current Allergies     Allergies as of 01/10/2025 - Reviewed 01/10/2025   Allergen Reaction Noted    Atorvastatin Myalgia and Other (See Comments) 06/25/2013    Cat hair extract Blisters 01/08/2024    Lactose - food allergy GI Intolerance 01/08/2024    Toprol xl [metoprolol] Hallucinations 11/27/2020    Ranolazine Fatigue 04/24/2020            The following portions of the patient's history were reviewed and updated as appropriate: allergies, current medications, past family history, past medical history, past social history, past surgical history and problem list.     Past Medical History:   Diagnosis Date    Coronary artery disease     Diabetes mellitus (HCC)     Hypertension     Lyme disease     Pertussis        Past Surgical History:   Procedure Laterality Date    CORONARY ARTERY BYPASS GRAFT      x3    FRACTURE SURGERY Left     knee    HYSTERECTOMY         Family History   Problem Relation Age of Onset    Hypertension Father          Medications have been verified.        Objective   /80   Pulse 83   Temp 99.6 °F (37.6 °C)   Resp 20   Ht 5' 1\" (1.549 m)   Wt 78.5 kg (173 lb)   LMP  (LMP Unknown)   SpO2 95%   BMI 32.69 kg/m²        Physical Exam     Physical Exam  Constitutional:       General: She is not in acute distress.     Appearance: Normal appearance. She is " ill-appearing.   HENT:      Head: Normocephalic.      Right Ear: Tympanic membrane and external ear normal.      Left Ear: Tympanic membrane and external ear normal.      Nose: Congestion present.      Mouth/Throat:      Mouth: Mucous membranes are moist.      Pharynx: Oropharynx is clear. Posterior oropharyngeal erythema (PND) present.   Cardiovascular:      Rate and Rhythm: Normal rate and regular rhythm.      Pulses: Normal pulses.      Heart sounds: Normal heart sounds.   Pulmonary:      Effort: Pulmonary effort is normal. No respiratory distress.      Breath sounds: Normal breath sounds. No stridor. No wheezing, rhonchi or rales.   Lymphadenopathy:      Cervical: No cervical adenopathy.   Skin:     General: Skin is warm and dry.   Neurological:      General: No focal deficit present.      Mental Status: She is alert and oriented to person, place, and time. Mental status is at baseline.   Psychiatric:         Mood and Affect: Mood normal.         Behavior: Behavior normal.         Thought Content: Thought content normal.         Judgment: Judgment normal.

## 2025-02-21 ENCOUNTER — APPOINTMENT (OUTPATIENT)
Dept: RADIOLOGY | Facility: CLINIC | Age: 80
End: 2025-02-21
Payer: MEDICARE

## 2025-02-21 ENCOUNTER — OFFICE VISIT (OUTPATIENT)
Dept: URGENT CARE | Facility: CLINIC | Age: 80
End: 2025-02-21
Payer: MEDICARE

## 2025-02-21 VITALS
HEIGHT: 61 IN | RESPIRATION RATE: 20 BRPM | DIASTOLIC BLOOD PRESSURE: 78 MMHG | HEART RATE: 85 BPM | WEIGHT: 173 LBS | OXYGEN SATURATION: 94 % | SYSTOLIC BLOOD PRESSURE: 140 MMHG | BODY MASS INDEX: 32.66 KG/M2 | TEMPERATURE: 97.2 F

## 2025-02-21 DIAGNOSIS — R05.3 CHRONIC COUGH: Primary | ICD-10-CM

## 2025-02-21 DIAGNOSIS — R05.1 ACUTE COUGH: ICD-10-CM

## 2025-02-21 PROCEDURE — G0463 HOSPITAL OUTPT CLINIC VISIT: HCPCS

## 2025-02-21 PROCEDURE — 71046 X-RAY EXAM CHEST 2 VIEWS: CPT

## 2025-02-21 PROCEDURE — 99214 OFFICE O/P EST MOD 30 MIN: CPT

## 2025-02-21 RX ORDER — MULTIVITAMIN/IRON/FOLIC ACID 18MG-0.4MG
1 TABLET ORAL DAILY
COMMUNITY

## 2025-02-21 RX ORDER — BUDESONIDE 0.5 MG/2ML
INHALANT ORAL
COMMUNITY
Start: 2025-02-04

## 2025-02-21 RX ORDER — IPRATROPIUM BROMIDE AND ALBUTEROL SULFATE 2.5; .5 MG/3ML; MG/3ML
SOLUTION RESPIRATORY (INHALATION)
COMMUNITY

## 2025-02-21 RX ORDER — ASPIRIN 81 MG/1
81 TABLET, CHEWABLE ORAL DAILY
COMMUNITY

## 2025-02-21 RX ORDER — METFORMIN HYDROCHLORIDE 500 MG/1
TABLET, EXTENDED RELEASE ORAL
COMMUNITY
Start: 2025-01-17

## 2025-02-21 RX ORDER — CODEINE PHOSPHATE AND GUAIFENESIN 10; 100 MG/5ML; MG/5ML
SOLUTION ORAL
COMMUNITY
Start: 2024-11-25

## 2025-02-21 RX ORDER — NITROGLYCERIN 0.4 MG/1
0.4 TABLET SUBLINGUAL
COMMUNITY

## 2025-02-21 RX ORDER — DEXTROMETHORPHAN HYDROBROMIDE AND PROMETHAZINE HYDROCHLORIDE 15; 6.25 MG/5ML; MG/5ML
5 SYRUP ORAL 4 TIMES DAILY PRN
Qty: 118 ML | Refills: 0 | Status: SHIPPED | OUTPATIENT
Start: 2025-02-21

## 2025-02-21 NOTE — PATIENT INSTRUCTIONS
Continue use of phenergan cough syrup as prescribed (refill provided today).  Tylenol for pain/fever.  Increased fluids & rest.    Monitor for worsening of symptoms. Should you develop worsening shortness of breath or chest pain, proceed to the ER or call 911.     Follow up with PCP in 3-5 days.  Proceed to  ER if symptoms worsen.    If tests have been performed at [facility], our office will contact you with results if changes need to be made to the care plan discussed with you at the visit.  You can review your full results on [facility].

## 2025-02-21 NOTE — PROGRESS NOTES
[facility]        NAME: Radha May is a 80 y.o. female  : 1945    MRN: [id number]  DATE: 2025  TIME: 5:37 PM    Assessment and Plan   Chronic cough [R05.3]  1. Chronic cough  XR chest pa and lateral    Ambulatory Referral to Pulmonology    promethazine-dextromethorphan (PHENERGAN-DM) 6.25-15 mg/5 mL oral syrup        Chest XR: No obvious abnormalities seen on preliminary XR reading. CXR appears improved in comparison to most recent imaging. Official radiology report pending.     No active signs of acute bacterial infection on exam.  Possible that symptoms are residual from pertussis, as patient states that she has been experiencing the symptoms ongoing since the time of her diagnosis.  Patient has tried taking numerous over-the-counter and prescription medications to relieve her symptoms without any relief.  At this point, plan to refer patient to pulmonology team for further specialized evaluation.  Oral steroids not prescribed at this time given that patient is diabetic with most recent DEXA scan in 2023 showing osteopenia with increased risk for fracture.  Refill on patient's Phenergan cough syrup provided at this time.  Strict ED precautions discussed with both patient and family present in the room. Tylenol/ibuprofen for pain/fever. Increased fluids & rest. May continue with other OTC and supportive therapies at home. Patient in agreement with plan & verbalized understanding.       Patient Instructions   Continue use of phenergan cough syrup as prescribed (refill provided today).  Tylenol for pain/fever.  Increased fluids & rest.    Monitor for worsening of symptoms. Should you develop worsening shortness of breath or chest pain, proceed to the ER or call 911.     Follow up with PCP in 3-5 days.  Proceed to  ER if symptoms worsen.    If tests have been performed at [facility], our office will contact you with results if changes need to be made to the care plan  discussed with you at the visit.  You can review your full results on [facility].    Chief Complaint     Chief Complaint   Patient presents with    Cold Like Symptoms     Has a cough intermittently since November. Was dx pertussis. Had steriods, 2 antibiotics and a nebulizer. Not getting better. Unable to use nebulizer because it causes her to shake. Feels very lightheaded from coughing.         History of Present Illness       Patient is a 80-year-old female who presents today for evaluation of cough and cold-like symptoms.  She reports that in November 2024 she was diagnosed with pertussis and has been experiencing the same symptoms ever since.  She reports that over the last 3 months her symptoms have been persistent and have not improved at all.  She has been prescribed numerous rounds of antibiotics, steroids, inhalers, nebulizer treatments, and cough suppressants without relief of her current symptoms.    URI   This is a new problem. The current episode started more than 1 month ago (x3 months). The problem has been unchanged. There has been no fever. Associated symptoms include congestion, coughing, rhinorrhea and a sore throat (From coughing). Pertinent negatives include no abdominal pain, chest pain, diarrhea, ear pain, headaches, nausea, rash, sinus pain or vomiting. Treatments tried: Phenergan with Codeine, Tessalon Perls. The treatment provided no relief.       Review of Systems   Review of Systems   Constitutional:  Positive for activity change, chills, diaphoresis and fatigue. Negative for appetite change and fever.   HENT:  Positive for congestion, rhinorrhea and sore throat (From coughing). Negative for ear pain, postnasal drip, sinus pressure, sinus pain and trouble swallowing.    Eyes:  Negative for discharge and redness.   Respiratory:  Positive for cough, chest tightness and shortness of breath.    Cardiovascular:  Negative for chest pain and palpitations.   Gastrointestinal:  Negative  for abdominal pain, diarrhea, nausea and vomiting.   Musculoskeletal:  Negative for gait problem and myalgias.   Skin:  Negative for color change, pallor and rash.   Neurological:  Positive for light-headedness. Negative for dizziness, weakness and headaches.   All other systems reviewed and are negative.        Current Medications       Current Outpatient Medications:     acetaminophen (TYLENOL) 650 mg CR tablet, Take 500 mg by mouth every 8 (eight) hours as needed for mild pain, Disp: , Rfl:     amLODIPine (NORVASC) 5 mg tablet, Take 1 tablet (5 mg total) by mouth daily, Disp: 30 tablet, Rfl: 0    aspirin 81 mg chewable tablet, Chew 81 mg daily, Disp: , Rfl:     Aspirin Buf,CaCarb-MgCarb-MgO, 81 MG TABS, Take 81 mg by mouth, Disp: , Rfl:     benzonatate (TESSALON PERLES) 100 mg capsule, Take 1 capsule (100 mg total) by mouth 3 (three) times a day as needed for cough, Disp: 20 capsule, Rfl: 0    clopidogrel (PLAVIX) 75 mg tablet, 75 mg every morning, Disp: , Rfl:     denosumab (PROLIA) 60 mg/mL, Inject 60 mg under the skin once, Disp: , Rfl:     DULoxetine (CYMBALTA) 60 mg delayed release capsule, Take 60 mg by mouth daily, Disp: , Rfl:     guaiFENesin-codeine (ROBITUSSIN AC) 100-10 mg/5 mL oral solution, Take by mouth, Disp: , Rfl:     insulin glargine (Lantus SoloStar) 100 units/mL injection pen, Inject 50 Units under the skin daily in the early morning, Disp: , Rfl:     Jardiance 25 MG TABS, TAKE 1 TABLET EVERY MORNING FOR GLUCOSE CONTROL, Disp: , Rfl:     metFORMIN (GLUCOPHAGE-XR) 500 mg 24 hr tablet, , Disp: , Rfl:     Multiple Vitamins-Minerals (Centrum Ultra Womens) TABS, Take 1 tablet by mouth daily, Disp: , Rfl:     nitroglycerin (NITROSTAT) 0.4 mg SL tablet, Place 0.4 mg under the tongue, Disp: , Rfl:     omeprazole (PriLOSEC) 10 mg delayed release capsule, Take 20 mg by mouth daily, Disp: , Rfl:     promethazine-dextromethorphan (PHENERGAN-DM) 6.25-15 mg/5 mL oral syrup, Take 5 mL by mouth 4 (four) times  a day as needed for cough, Disp: 118 mL, Rfl: 0    rosuvastatin (CRESTOR) 40 MG tablet, , Disp: , Rfl:     sertraline (ZOLOFT) 100 mg tablet, Take 100 mg by mouth daily, Disp: , Rfl:     valsartan (DIOVAN) 80 mg tablet, Take 80 mg by mouth 2 (two) times a day, Disp: , Rfl:     verapamil (Verelan) 120 MG 24 hr capsule, Take 120 mg by mouth daily, Disp: , Rfl:     Breztri Aerosphere 160-9-4.8 MCG/ACT AERO, take 1 puff by mouth twice a day (Patient not taking: Reported on 1/10/2025), Disp: , Rfl:     budesonide (PULMICORT) 0.5 mg/2 mL nebulizer solution, Inhale (Patient not taking: Reported on 2/21/2025), Disp: , Rfl:     coenzyme Q-10 100 MG capsule, Take 100 mg by mouth daily (Patient not taking: Reported on 2/21/2025), Disp: , Rfl:     ipratropium-albuterol (DUO-NEB) 0.5-2.5 mg/3 mL nebulizer solution, , Disp: , Rfl:     oxyCODONE (Roxicodone) 5 immediate release tablet, Take 1 tablet (5 mg total) by mouth every 6 (six) hours as needed for severe pain for up to 15 doses Max Daily Amount: 20 mg (Patient not taking: Reported on 2/21/2025), Disp: 15 tablet, Rfl: 0    polyethylene glycol (MIRALAX) 17 g packet, Take 17 g by mouth daily as needed (take if no bm in 2 days) (Patient not taking: Reported on 1/10/2025), Disp: 1 each, Rfl: 0    Current Allergies     Allergies as of 02/21/2025 - Reviewed 02/21/2025   Allergen Reaction Noted    Atorvastatin Myalgia and Other (See Comments) 06/25/2013    Cat dander Blisters 01/08/2024    Lactose - food allergy GI Intolerance 01/08/2024    Toprol xl [metoprolol] Hallucinations 11/27/2020    Ranolazine Fatigue 04/24/2020            The following portions of the patient's history were reviewed and updated as appropriate: allergies, current medications, past family history, past medical history, past social history, past surgical history and problem list.     Past Medical History:   Diagnosis Date    Arthritis     Coronary artery disease     Diabetes mellitus (HCC)     Hypertension   "   Inflammatory bowel disease     Lyme disease     Pertussis        Past Surgical History:   Procedure Laterality Date    BREAST LUMPECTOMY Right     CORONARY ARTERY BYPASS GRAFT      x3    FRACTURE SURGERY Left     knee    HYSTERECTOMY         Family History   Problem Relation Age of Onset    Cancer Father     Hypertension Father          Medications have been verified.        Objective   /78   Pulse 85   Temp (!) 97.2 °F (36.2 °C)   Resp 20   Ht 5' 1\" (1.549 m)   Wt 78.5 kg (173 lb)   LMP  (LMP Unknown)   SpO2 94%   BMI 32.69 kg/m²   No LMP recorded (lmp unknown). Patient has had a hysterectomy.       Physical Exam     Physical Exam  Vitals and nursing note reviewed.   Constitutional:       Appearance: Normal appearance. She is ill-appearing.   HENT:      Head: Normocephalic and atraumatic.      Right Ear: Tympanic membrane, ear canal and external ear normal.      Left Ear: Tympanic membrane, ear canal and external ear normal.      Nose: Congestion present. No rhinorrhea.      Right Sinus: No maxillary sinus tenderness or frontal sinus tenderness.      Left Sinus: No maxillary sinus tenderness or frontal sinus tenderness.      Mouth/Throat:      Lips: Pink. No lesions.      Mouth: Mucous membranes are moist.      Pharynx: Oropharynx is clear. Uvula midline. No pharyngeal swelling, oropharyngeal exudate, posterior oropharyngeal erythema or uvula swelling.      Tonsils: No tonsillar exudate.   Eyes:      Extraocular Movements: Extraocular movements intact.      Conjunctiva/sclera: Conjunctivae normal.      Pupils: Pupils are equal, round, and reactive to light.   Cardiovascular:      Rate and Rhythm: Normal rate and regular rhythm.      Pulses: Normal pulses.      Heart sounds: Murmur heard.   Pulmonary:      Effort: Pulmonary effort is normal. No respiratory distress.      Breath sounds: Normal breath sounds. No stridor. No wheezing, rhonchi or rales.      Comments: Coarse sounds in bilateral " bases.  Chest:      Chest wall: No tenderness.   Musculoskeletal:         General: Normal range of motion.      Cervical back: Normal range of motion and neck supple. No tenderness.   Lymphadenopathy:      Cervical: No cervical adenopathy.   Skin:     General: Skin is warm and dry.      Capillary Refill: Capillary refill takes less than 2 seconds.      Coloration: Skin is not pale.      Findings: No erythema or rash.   Neurological:      General: No focal deficit present.      Mental Status: She is alert. Mental status is at baseline.   Psychiatric:         Mood and Affect: Mood normal.         Behavior: Behavior normal.         Thought Content: Thought content normal.         Judgment: Judgment normal.

## 2025-03-05 ENCOUNTER — CONSULT (OUTPATIENT)
Dept: PULMONOLOGY | Facility: CLINIC | Age: 80
End: 2025-03-05
Payer: MEDICARE

## 2025-03-05 VITALS
HEIGHT: 61 IN | OXYGEN SATURATION: 97 % | HEART RATE: 73 BPM | DIASTOLIC BLOOD PRESSURE: 81 MMHG | SYSTOLIC BLOOD PRESSURE: 126 MMHG | WEIGHT: 174 LBS | BODY MASS INDEX: 32.85 KG/M2 | TEMPERATURE: 97.8 F

## 2025-03-05 DIAGNOSIS — R06.09 DYSPNEA ON EXERTION: ICD-10-CM

## 2025-03-05 DIAGNOSIS — R09.81 NASAL CONGESTION: ICD-10-CM

## 2025-03-05 DIAGNOSIS — R05.3 CHRONIC COUGH: ICD-10-CM

## 2025-03-05 DIAGNOSIS — J96.91 RESPIRATORY FAILURE WITH HYPOXIA, UNSPECIFIED CHRONICITY (HCC): ICD-10-CM

## 2025-03-05 DIAGNOSIS — J45.30 MILD PERSISTENT ASTHMA WITHOUT COMPLICATION: Primary | ICD-10-CM

## 2025-03-05 PROBLEM — E66.09 CLASS 1 OBESITY DUE TO EXCESS CALORIES WITH SERIOUS COMORBIDITY AND BODY MASS INDEX (BMI) OF 34.0 TO 34.9 IN ADULT: Status: ACTIVE | Noted: 2022-07-29

## 2025-03-05 PROBLEM — G45.9 TIA (TRANSIENT ISCHEMIC ATTACK): Status: ACTIVE | Noted: 2020-07-15

## 2025-03-05 PROBLEM — G47.33 OBSTRUCTIVE SLEEP APNEA SYNDROME: Status: ACTIVE | Noted: 2020-05-07

## 2025-03-05 PROBLEM — E66.811 CLASS 1 OBESITY DUE TO EXCESS CALORIES WITH SERIOUS COMORBIDITY AND BODY MASS INDEX (BMI) OF 34.0 TO 34.9 IN ADULT: Status: ACTIVE | Noted: 2022-07-29

## 2025-03-05 PROBLEM — Z95.1 S/P CABG (CORONARY ARTERY BYPASS GRAFT): Status: ACTIVE | Noted: 2018-04-30

## 2025-03-05 PROBLEM — F41.1 GENERALIZED ANXIETY DISORDER: Status: ACTIVE | Noted: 2017-10-12

## 2025-03-05 PROBLEM — I25.810 CORONARY ARTERY DISEASE INVOLVING CORONARY BYPASS GRAFT OF NATIVE HEART WITHOUT ANGINA PECTORIS: Status: ACTIVE | Noted: 2025-03-05

## 2025-03-05 LAB

## 2025-03-05 PROCEDURE — 94618 PULMONARY STRESS TESTING: CPT

## 2025-03-05 PROCEDURE — 99204 OFFICE O/P NEW MOD 45 MIN: CPT

## 2025-03-05 RX ORDER — FLUTICASONE PROPIONATE 50 MCG
2 SPRAY, SUSPENSION (ML) NASAL DAILY
Qty: 9.9 ML | Refills: 5 | Status: SHIPPED | OUTPATIENT
Start: 2025-03-05

## 2025-03-05 RX ORDER — MOMETASONE FUROATE 100 UG/1
2 AEROSOL RESPIRATORY (INHALATION) 2 TIMES DAILY
Qty: 13 G | Refills: 2 | Status: SHIPPED | OUTPATIENT
Start: 2025-03-05

## 2025-03-05 NOTE — PROGRESS NOTES
Consultation - Pulmonary Medicine   Radha May 80 y.o. female MRN: [id number]    Physician Requesting Consult: TYE Alejandra  Reason for Consult: Chronic cough  Radha May is a 80 y.o. female with PMHx of CAD s/p CABG, NOEMI on BiPAP, TIA, HTN, HLD, DM2, IBS, GWEN and obesity who presents for pulmonary evaluation.    Hypoxic respiratory failure, unspecified chronicity  Dyspnea on exertion  Chronic cough  - Etiology is not immediately clear, she has had a cough for the last 4-5 months which has been slowly improving, but not obviously responsive to multiple nebs/inhalers, steroids and antibiotics.  Most recent CXR reviewed which appears to have prominent interstitial markings, but no clear opacifications/effusion.  - Check CT chest and PFTs  - Ambulatory pulse ox in the office today demonstrated requirement for 1 L O2 with exertion, O2 therapy prescribed.  - Trial of ICS inhaler given intolerance with other inhalers/nebulizers, appears asmanex HFA is covered.  - As cough is still productive check sputum Cx  - Follow up in 6-8 weeks.  -     Home Oxygen with Portability  -     Home O2 Setup  -     Mometasone Furoate (Asmanex HFA) 100 MCG/ACT AERO; Inhale 2 puffs (200 mcg total) 2 (two) times a day Rinse mouth after use.  -     Ambulatory Referral to Pulmonology  -     Sputum culture and Gram stain; Future  -     CT chest wo contrast; Future  -     Complete PFT; Future  -     POCT Oxygen Titration    Nasal congestion  - Trial of flonase 2 sprays per nostril once daily.  -     fluticasone (FLONASE) 50 mcg/act nasal spray; 2 sprays into each nostril daily    Thank you for allowing me to participate in the care of your patient.  If there are any questions regarding evaluation please feel free to reach out.     Return in about 8 weeks (around 4/30/2025).  ______________________________________________________________________    HPI:    Radha May is a 80 y.o. female with PMHx of CAD s/p CABG, NOEMI on  BiPAP, TIA, HTN, HLD, DM2, IBS, GWEN and obesity who presents for pulmonary evaluation.  The patient was referred by urgent care due to a cough which has been present for 4-5 months.  The patient  reports that she has no prior pulmonary diagnoses and was not on any inhaler therapy prior to development of this cough 4 to 5 months ago.  She denies any clear trigger for initiation of the cough, but states there has been a slow trend towards improvement.  She did have a CXR 2 view in 2/2025 that was read as no acute disease; however, on review it does appear there is pronounced interstitial markings.  She describes the cough is productive with a constant sensation of mucus stuck in her throat.  Over time the sputum has gone from green to light yellow currently.  She does also note dyspnea particular with talking and exertion and estimates she can walk 1 block or 1 flight of steps for needing to rest.  Additionally she is having a fair amount of fatigue over this time along with wheezing that does appear to be improving.  Her symptoms are worse first thing in the morning and improves throughout the day.  She notes that she has been tried on a few medications for this including guaifenesin with codeine and tessalon perles which did not give any benefit, Breztri inhaler which caused side effects and she stopped after 1 dose, DuoNebs which she states caused hypotension that lasted 10 minutes before resolution, steroids which caused hyperglycemia, 2 courses of antibiotics (z-ann and unclear 2nd med) and saline nebulizers which did not give her benefit.  She does also have budesonide nebulizers on her med list although it is unclear if she ever used this medication.  She does have a significant cardiac history, but notes any chest pain, palpitations, pedal edema or syncope and did have an LHC during hospitalization 8/2024.  She states she did not reach out to her cardiologist about the episode of hypotension after taking  DuoNeb.  She denies significant allergies although does have a lot of postnasal drip and is not currently on any nasal sprays although she has had good benefit with Flonase in the past.    She does also have a history of severe NOEMI (AHI 81.1 in 2020) and is following with [facility] sleep medicine.  She is currently on BiPAP and notes her device is 1-year-old.  She states her initial diagnosis of sleep apnea was roughly 20 years ago.  She is keeping the device clean and routinely getting supply refills.    Tobacco/Vaping: never smoker, denies all forms of tobacco/nicotine.  Work Hx: retired, medical technician, [employer], retail work  Exposure Hx: no known exposures  Pets: none, never any birds  Reflux Symptoms: yes, but well controlled with omeprazole, reports history of hiatal hernia and Schatzki's ring which has been dilated x2.  She was previously following with GI, hasn't seen them lately though.  Does note swallowing has worsened over the last year.  Travel Hx: denies recent travel, last was [place] in 2022, last time abroad was early 2000s to the [place].  Family Pulmonary Hx: denies    Review of Systems:  Review of Systems  10-point system review completed, all of which are negative except as mentioned above.    Current Medications:    Current Outpatient Medications:     fluticasone (FLONASE) 50 mcg/act nasal spray, 2 sprays into each nostril daily, Disp: 9.9 mL, Rfl: 5    guaiFENesin-codeine (ROBITUSSIN AC) 100-10 mg/5 mL oral solution, Take by mouth, Disp: , Rfl:     Mometasone Furoate (Asmanex HFA) 100 MCG/ACT AERO, Inhale 2 puffs (200 mcg total) 2 (two) times a day Rinse mouth after use., Disp: 13 g, Rfl: 2    acetaminophen (TYLENOL) 650 mg CR tablet, Take 500 mg by mouth every 8 (eight) hours as needed for mild pain, Disp: , Rfl:     amLODIPine (NORVASC) 5 mg tablet, Take 1 tablet (5 mg total) by mouth daily, Disp: 30 tablet, Rfl: 0    aspirin 81 mg chewable tablet, Chew 81 mg daily,  Disp: , Rfl:     Aspirin Buf,CaCarb-MgCarb-MgO, 81 MG TABS, Take 81 mg by mouth, Disp: , Rfl:     benzonatate (TESSALON PERLES) 100 mg capsule, Take 1 capsule (100 mg total) by mouth 3 (three) times a day as needed for cough, Disp: 20 capsule, Rfl: 0    clopidogrel (PLAVIX) 75 mg tablet, 75 mg every morning, Disp: , Rfl:     coenzyme Q-10 100 MG capsule, Take 100 mg by mouth daily (Patient not taking: Reported on 2/21/2025), Disp: , Rfl:     denosumab (PROLIA) 60 mg/mL, Inject 60 mg under the skin once, Disp: , Rfl:     DULoxetine (CYMBALTA) 60 mg delayed release capsule, Take 60 mg by mouth daily, Disp: , Rfl:     insulin glargine (Lantus SoloStar) 100 units/mL injection pen, Inject 50 Units under the skin daily in the early morning, Disp: , Rfl:     ipratropium-albuterol (DUO-NEB) 0.5-2.5 mg/3 mL nebulizer solution, , Disp: , Rfl:     Jardiance 25 MG TABS, TAKE 1 TABLET EVERY MORNING FOR GLUCOSE CONTROL, Disp: , Rfl:     metFORMIN (GLUCOPHAGE-XR) 500 mg 24 hr tablet, , Disp: , Rfl:     Multiple Vitamins-Minerals (Centrum Ultra Womens) TABS, Take 1 tablet by mouth daily, Disp: , Rfl:     nitroglycerin (NITROSTAT) 0.4 mg SL tablet, Place 0.4 mg under the tongue, Disp: , Rfl:     omeprazole (PriLOSEC) 10 mg delayed release capsule, Take 20 mg by mouth daily, Disp: , Rfl:     oxyCODONE (Roxicodone) 5 immediate release tablet, Take 1 tablet (5 mg total) by mouth every 6 (six) hours as needed for severe pain for up to 15 doses Max Daily Amount: 20 mg (Patient not taking: Reported on 2/21/2025), Disp: 15 tablet, Rfl: 0    polyethylene glycol (MIRALAX) 17 g packet, Take 17 g by mouth daily as needed (take if no bm in 2 days) (Patient not taking: Reported on 1/10/2025), Disp: 1 each, Rfl: 0    promethazine-dextromethorphan (PHENERGAN-DM) 6.25-15 mg/5 mL oral syrup, Take 5 mL by mouth 4 (four) times a day as needed for cough, Disp: 118 mL, Rfl: 0    rosuvastatin (CRESTOR) 40 MG tablet, , Disp: , Rfl:     sertraline (ZOLOFT)  "100 mg tablet, Take 100 mg by mouth daily, Disp: , Rfl:     valsartan (DIOVAN) 80 mg tablet, Take 80 mg by mouth 2 (two) times a day, Disp: , Rfl:     verapamil (Verelan) 120 MG 24 hr capsule, Take 120 mg by mouth daily, Disp: , Rfl:     Historical Information   Past Medical History:   Diagnosis Date    Arthritis     Coronary artery disease     Diabetes mellitus (HCC)     Hypertension     Inflammatory bowel disease     Lyme disease     Pertussis      Past Surgical History:   Procedure Laterality Date    BREAST LUMPECTOMY Right     CORONARY ARTERY BYPASS GRAFT      x3    FRACTURE SURGERY Left     knee    HYSTERECTOMY     Social History   Social History     Tobacco Use   Smoking Status Never   Smokeless Tobacco Never     Family History:   Family History   Problem Relation Age of Onset    Cancer Father     Hypertension Father      PhysicalExamination:  Vitals:   /81 (BP Location: Left arm, Patient Position: Sitting, Cuff Size: Adult)   Pulse 73   Temp 97.8 °F (36.6 °C) (Temporal)   Ht 5' 1\" (1.549 m)   Wt 78.9 kg (174 lb)   LMP  (LMP Unknown)   SpO2 97%   BMI 32.88 kg/m²   Body mass index is 32.88 kg/m².    Constitutional: NAD, on room air, no conversational dyspnea   Skin: Warm, dry, no rashes noted   Eyes: PERRL, normal conjunctiva  ENT: Nasal congestion absent, moist mucus membranes.  Neck: No JVD, trachea is midline, no adenopathy.  Resp: CTA B/L, no W/R/R   Cardiac: RRR, +S1/S2, no M/R/G  Extremities: No digital clubbing or pedal edema  Neuro: AAOx3    Diagnostic Data:  Labs:  I personally reviewed the most recent laboratory data pertinent to today's visit    Lab Results   Component Value Date    WBC 9.95 12/05/2024    HGB 14.4 12/05/2024    HCT 44.0 12/05/2024    MCV 86 12/05/2024     12/05/2024     Lab Results   Component Value Date    CALCIUM 9.5 12/05/2024    K 4.1 12/05/2024    CO2 24 12/05/2024     12/05/2024    BUN 22 12/05/2024    CREATININE 0.97 12/05/2024     No results found " "for: \"IGE\"  Lab Results   Component Value Date    ALT 23 12/05/2024    AST 15 12/05/2024    ALKPHOS 32 (L) 12/05/2024     PFT results:  None    Imaging:  I personally reviewed the images in PACS pertinent to today's visit:  CXR 2 View -- 2/21/2025  No acute cardiopulmonary disease.    CTA PE Study -- 8/10/2024 ([facility], read only)  LUNGS/PLEURA: No pleural effusion or pneumothorax.  Minimal bibasilar atelectasis.  No consolidation.  Calcified granulomas both lungs.   LARGE AIRWAYS: Unremarkable   VESSELS: No filling defect pulmonary arterial system.Atherosclerotic changes in the aorta and coronary arteries.   HEART: Unremarkable   MEDIASTINUM AND NIMESH: Calcified mediastinal lymphadenopathy.   CHEST WALL/SOFT TISSUES: Median sternotomy.     Other studies:  None      Tawanna Plata MD  Pulmonary-Critical Care and Sleep Medicine  03/13/25    Portions of the record may have been created with voice recognition software. Occasional wrong word or \"sound a like\" substitutions may have occurred due to the inherent limitations of voice recognition software. Please read the chart carefully and recognize, using context, where substitutions have occurred.  "

## 2025-03-07 ENCOUNTER — TELEPHONE (OUTPATIENT)
Age: 80
End: 2025-03-07

## 2025-03-07 NOTE — TELEPHONE ENCOUNTER
[facility] [place], called and stated patient is currently at lab to drop off sputum sample. Lab is requesting order be faxed to them asap. Please advise.    [facility]  FAX: [phone]

## 2025-03-24 ENCOUNTER — HOSPITAL ENCOUNTER (OUTPATIENT)
Dept: CT IMAGING | Facility: HOSPITAL | Age: 80
Discharge: HOME | End: 2025-03-24
Payer: MEDICARE

## 2025-03-24 ENCOUNTER — HOSPITAL ENCOUNTER (OUTPATIENT)
Dept: PULMONOLOGY | Facility: HOSPITAL | Age: 80
Discharge: HOME | End: 2025-03-24
Payer: MEDICARE

## 2025-03-24 DIAGNOSIS — R05.3 CHRONIC COUGH: ICD-10-CM

## 2025-03-24 DIAGNOSIS — R06.09 DYSPNEA ON EXERTION: ICD-10-CM

## 2025-03-24 PROCEDURE — 94726 PLETHYSMOGRAPHY LUNG VOLUMES: CPT | Performed by: INTERNAL MEDICINE

## 2025-03-24 PROCEDURE — 94726 PLETHYSMOGRAPHY LUNG VOLUMES: CPT

## 2025-03-24 PROCEDURE — 71250 CT THORAX DX C-: CPT

## 2025-03-24 PROCEDURE — 94060 EVALUATION OF WHEEZING: CPT

## 2025-03-24 PROCEDURE — 94760 N-INVAS EAR/PLS OXIMETRY 1: CPT

## 2025-03-24 PROCEDURE — 94729 DIFFUSING CAPACITY: CPT | Performed by: INTERNAL MEDICINE

## 2025-03-24 PROCEDURE — 94060 EVALUATION OF WHEEZING: CPT | Performed by: INTERNAL MEDICINE

## 2025-03-24 PROCEDURE — 94729 DIFFUSING CAPACITY: CPT

## 2025-03-24 RX ORDER — IPRATROPIUM BROMIDE AND ALBUTEROL SULFATE 2.5; .5 MG/3ML; MG/3ML
3 SOLUTION RESPIRATORY (INHALATION) ONCE
Status: COMPLETED | OUTPATIENT
Start: 2025-03-24 | End: 2025-03-24

## 2025-03-24 RX ORDER — ALBUTEROL SULFATE 0.83 MG/ML
2.5 SOLUTION RESPIRATORY (INHALATION) ONCE AS NEEDED
Status: DISCONTINUED | OUTPATIENT
Start: 2025-03-24 | End: 2025-03-28 | Stop reason: HOSPADM

## 2025-03-24 RX ADMIN — IPRATROPIUM BROMIDE AND ALBUTEROL SULFATE 3 ML: 2.5; .5 SOLUTION RESPIRATORY (INHALATION) at 10:58

## 2025-04-02 ENCOUNTER — RESULTS FOLLOW-UP (OUTPATIENT)
Dept: SLEEP CENTER | Facility: CLINIC | Age: 80
End: 2025-04-02

## 2025-04-03 DIAGNOSIS — R13.10 DYSPHAGIA, UNSPECIFIED TYPE: ICD-10-CM

## 2025-04-03 DIAGNOSIS — E04.1 THYROID NODULE: Primary | ICD-10-CM

## 2025-04-03 DIAGNOSIS — R05.3 CHRONIC COUGH: ICD-10-CM

## 2025-04-03 DIAGNOSIS — I77.819 ECTASIS AORTA (HCC): ICD-10-CM

## 2025-04-04 ENCOUNTER — TELEPHONE (OUTPATIENT)
Age: 80
End: 2025-04-04

## 2025-04-04 NOTE — TELEPHONE ENCOUNTER
"Patient called and stated Dr Forte's office would need a referral for her to schedule. Patient provided fax number and stated she was advised by Dr Forte's office to have Dr Plata add to referral \"urgent: patient should be seen within 3 days\" Please fax referral to number provided.     Dr Forte-ENT  FAX: [phone]  "

## 2025-04-04 NOTE — TELEPHONE ENCOUNTER
Pt wanted to make an appt with ENT but does not want to wait until Aug to be seen in [place]. Pt didn't want to go far from home and [place] were to far so I gave her the phone number for Dr Forte, [phone] and Dr Kulkarni [phone] both in [place]

## 2025-04-08 ENCOUNTER — TELEPHONE (OUTPATIENT)
Age: 80
End: 2025-04-08

## 2025-04-08 NOTE — TELEPHONE ENCOUNTER
Our call did disconnect, but I had called her back and we discussed all the findings on the CT chest please see the results note on 4/2/2025 for full discussion about both calls.  Can you please let the patient know the letter is about what we discuss which was: referral to ENT for the thyroid nodule and follow up CT in 1 year due to the enlarged aorta which has been ordered, thanks.

## 2025-04-08 NOTE — TELEPHONE ENCOUNTER
Called and spoke with Radha made her aware of same she stated understanding and voiced no concerns at this time.

## 2025-04-08 NOTE — TELEPHONE ENCOUNTER
Patient called in regarding a letter she received from the radiology dept to contact her Pulm doctor. Patient stated  did give her a call  but the call was disconnected and did not get her CT results . Patient would like for the provider to give her a call in regards to her CT results . Please advise

## 2025-04-09 NOTE — TELEPHONE ENCOUNTER
Spoke with [facility]. The nurse on the phone wanted to know why patient was being referred to them since all her issues looked Pulmonary related. I advised her that per Dr. Plata's notes she referred patient to ENT per thyroid nodule found in CT scan of the chest. She states that they tried to see patient for this a while ago but patient was not interested at the time. She also stated that Dr. Plata put in an urgent referral for patient to be seen within 3 days, but they do not have availability and per their protocols, they do not see patients as urgent for this nodule size, but they will reach out to the patient for an appointment now that they have more clarification.

## 2025-05-01 ENCOUNTER — OFFICE VISIT (OUTPATIENT)
Dept: PULMONOLOGY | Facility: CLINIC | Age: 80
End: 2025-05-01
Payer: MEDICARE

## 2025-05-01 VITALS
HEIGHT: 61 IN | HEART RATE: 65 BPM | SYSTOLIC BLOOD PRESSURE: 159 MMHG | WEIGHT: 188.8 LBS | TEMPERATURE: 97.9 F | BODY MASS INDEX: 35.65 KG/M2 | DIASTOLIC BLOOD PRESSURE: 78 MMHG | OXYGEN SATURATION: 97 %

## 2025-05-01 DIAGNOSIS — R09.82 POST-NASAL DRIP: ICD-10-CM

## 2025-05-01 DIAGNOSIS — R05.3 CHRONIC COUGH: ICD-10-CM

## 2025-05-01 DIAGNOSIS — J96.01 ACUTE HYPOXIC RESPIRATORY FAILURE (HCC): Primary | ICD-10-CM

## 2025-05-01 DIAGNOSIS — I77.819 ECTASIS AORTA (HCC): ICD-10-CM

## 2025-05-01 PROCEDURE — 94618 PULMONARY STRESS TESTING: CPT

## 2025-05-01 PROCEDURE — 99214 OFFICE O/P EST MOD 30 MIN: CPT

## 2025-05-01 RX ORDER — IPRATROPIUM BROMIDE 21 UG/1
2 SPRAY, METERED NASAL EVERY 12 HOURS
Qty: 30 ML | Refills: 2 | Status: SHIPPED | OUTPATIENT
Start: 2025-05-01 | End: 2025-05-01

## 2025-05-01 RX ORDER — IPRATROPIUM BROMIDE 21 UG/1
2 SPRAY, METERED NASAL EVERY 12 HOURS
Qty: 30 ML | Refills: 2 | Status: SHIPPED | OUTPATIENT
Start: 2025-05-01

## 2025-05-01 NOTE — PROGRESS NOTES
Progress note - Pulmonary Medicine   Radha May 80 y.o. female MRN: [id number]       Impression & Plan:   Radha May is a 80 y.o. female with PMHx of CAD s/p CABG, NOEMI on BiPAP, TIA, HTN, HLD, DM2, IBS, GWEN and obesity who presents for follow-up.    Acute hypoxic respiratory failure (HCC)  -At the last office visit the patient was found to need 1 L O2 with exertion.  On repeat ambulatory pulse ox in the office today saturations remained 94% and higher on room air at a brisk pace.  It is unclear what caused her acute hypoxia although appears to have resolved.  CT and PFTs were unrevealing for concerning findings and she has an echo pending read.  - She can discontinue oxygen therapy.  -     POCT Oxygen Titration    Chronic cough  Post-nasal drip  - Suspect her chronic cough may be related to her postnasal drip.  She has had some mild benefit with Flonase, but continues to have symptoms.  Additionally she does have multinodular goiter which is causing deviation of the trachea and could be contributing as well.  - Recommend continued follow-up with ENT.  - Will switch to ipratropium nasal spray 2 sprays per nostril twice daily.  -If cough persists despite better control of her postnasal drip could revisit ICS inhaler given normal PFTs and CT without any concerning findings.  -     ipratropium (ATROVENT) 0.03 % nasal spray; 2 sprays into each nostril every 12 (twelve) hours    Ectasis aorta (HCC)  - Follow up CT in 1 year, order previously placed.  ______________________________________________________________________    HPI:    Radha May is a 80 y.o. female with PMHx of CAD s/p CABG, NOEMI on BiPAP, TIA, HTN, HLD, DM2, IBS, GWEN and obesity who presents for follow-up.  The patient was last seen in the office on 3/5/2025 at which time  she was found to have a requirement for 1 L O2 with exertion and was noting having significant difficulty tolerating inhalers.  Plan was for CT chest, PFTs, initiation of 1 L O2  with exertion, sputum culture Asmanex 100 mcg and Flonase 2 sprays per nostril once daily.  PFTs were normal and CT chest was unrevealing for cause of hypoxia.  She states she never received the Asmanex inhaler and was unable to provide a sputum sample.  She was referred to ENT based on enlarged thyroid with deviation of the trachea who have ordered a CT neck and ultrasound of thyroid.  She also saw her cardiologist since then and has an echo that was just completed but not yet read.  She states she is feeling better from prior and has increased her activity since last office visit.  She is now mowing her 1 acre lawn without much issue and is using her oxygen intermittently.  She is spot checking her saturations and notes a jasper of 93%.  She does still have a cough, and states it is slightly better since the Flonase although this is not completely controlling her postnasal drip.  She does note intermittent lightheadedness, but denies falls, syncope, chest pain, palpitations.    She discontinues her BiPAP nightly and is following with sleep medicine at [facility].  She notes no issues with her device and gets good benefit from usage.    Current Medications:    Current Outpatient Medications:     aspirin 81 mg chewable tablet, Chew 81 mg daily, Disp: , Rfl:     Aspirin Buf,CaCarb-MgCarb-MgO, 81 MG TABS, Take 81 mg by mouth, Disp: , Rfl:     clopidogrel (PLAVIX) 75 mg tablet, 75 mg every morning, Disp: , Rfl:     denosumab (PROLIA) 60 mg/mL, Inject 60 mg under the skin once, Disp: , Rfl:     DULoxetine (CYMBALTA) 60 mg delayed release capsule, Take 60 mg by mouth daily, Disp: , Rfl:     fluticasone (FLONASE) 50 mcg/act nasal spray, 2 sprays into each nostril daily, Disp: 9.9 mL, Rfl: 5    insulin glargine (Lantus SoloStar) 100 units/mL injection pen, Inject 50 Units under the skin daily in the early morning, Disp: , Rfl:     ipratropium (ATROVENT) 0.03 % nasal spray, 2 sprays into each nostril every 12 (twelve) hours,  Disp: 30 mL, Rfl: 2    Jardiance 25 MG TABS, TAKE 1 TABLET EVERY MORNING FOR GLUCOSE CONTROL, Disp: , Rfl:     metFORMIN (GLUCOPHAGE-XR) 500 mg 24 hr tablet, , Disp: , Rfl:     Multiple Vitamins-Minerals (Centrum Ultra Womens) TABS, Take 1 tablet by mouth daily, Disp: , Rfl:     nitroglycerin (NITROSTAT) 0.4 mg SL tablet, Place 0.4 mg under the tongue, Disp: , Rfl:     omeprazole (PriLOSEC) 10 mg delayed release capsule, Take 20 mg by mouth daily, Disp: , Rfl:     promethazine-dextromethorphan (PHENERGAN-DM) 6.25-15 mg/5 mL oral syrup, Take 5 mL by mouth 4 (four) times a day as needed for cough, Disp: 118 mL, Rfl: 0    rosuvastatin (CRESTOR) 40 MG tablet, , Disp: , Rfl:     valsartan (DIOVAN) 80 mg tablet, Take 80 mg by mouth 2 (two) times a day, Disp: , Rfl:     verapamil (Verelan) 120 MG 24 hr capsule, Take 120 mg by mouth daily, Disp: , Rfl:     acetaminophen (TYLENOL) 650 mg CR tablet, Take 500 mg by mouth every 8 (eight) hours as needed for mild pain, Disp: , Rfl:     amLODIPine (NORVASC) 5 mg tablet, Take 1 tablet (5 mg total) by mouth daily, Disp: 30 tablet, Rfl: 0    benzonatate (TESSALON PERLES) 100 mg capsule, Take 1 capsule (100 mg total) by mouth 3 (three) times a day as needed for cough, Disp: 20 capsule, Rfl: 0    coenzyme Q-10 100 MG capsule, Take 100 mg by mouth daily (Patient not taking: Reported on 5/1/2025), Disp: , Rfl:     guaiFENesin-codeine (ROBITUSSIN AC) 100-10 mg/5 mL oral solution, Take by mouth, Disp: , Rfl:     oxyCODONE (Roxicodone) 5 immediate release tablet, Take 1 tablet (5 mg total) by mouth every 6 (six) hours as needed for severe pain for up to 15 doses Max Daily Amount: 20 mg (Patient not taking: Reported on 2/21/2025), Disp: 15 tablet, Rfl: 0    polyethylene glycol (MIRALAX) 17 g packet, Take 17 g by mouth daily as needed (take if no bm in 2 days) (Patient not taking: Reported on 1/10/2025), Disp: 1 each, Rfl: 0    sertraline (ZOLOFT) 100 mg tablet, Take 100 mg by mouth daily,  "Disp: , Rfl:     Review of Systems:  Review of Systems  10-point system review completed, all of which are negative except as mentioned above.    Past medical history, surgical history, and family history were reviewed and updated as appropriate    Social history updates:  Social History     Tobacco Use   Smoking Status Never   Smokeless Tobacco Never     PhysicalExamination:  Vitals:   /78   Pulse 65   Temp 97.9 °F (36.6 °C)   Ht 5' 1\" (1.549 m)   Wt 85.6 kg (188 lb 12.8 oz)   LMP  (LMP Unknown)   SpO2 97%   BMI 35.67 kg/m²   Body mass index is 35.67 kg/m².    Constitutional: NAD, well appearing, on room air, no conversational dyspnea   Skin: Warm, dry, no rashes noted   Eyes: PERRL, normal conjunctiva  ENT: Nasal congestion present, moist mucus membranes.  Neck: No JVD, trachea is midline, no adenopathy.  Resp: CTA B/L, no W/R/R   Cardiac: RRR, +S1/S2, no M/R/G  Extremities: No digital clubbing, +1 B/L pedal edema  Neuro: AAOx3    Diagnostic Data:  Labs:  I personally reviewed the most recent laboratory data pertinent to today's visit    Lab Results   Component Value Date    WBC 9.95 12/05/2024    HGB 14.4 12/05/2024    HCT 44.0 12/05/2024    MCV 86 12/05/2024     12/05/2024     Lab Results   Component Value Date    SODIUM 136 12/05/2024    K 4.1 12/05/2024    CO2 24 12/05/2024     12/05/2024    BUN 22 12/05/2024    CREATININE 0.9 04/03/2025    CALCIUM 10.4 (H) 04/03/2025     PFT results:  The most recent pulmonary function tests were reviewed.  PFTs w/ BD -- 3/24/2025    % Predicted Z-Score   FVC 2.15 L 100% 0.00   FEV1 1.66 L 99% -0.04   FEV1/FVC 77  0      After administration of bronchodilator:      % Change   FVC 2.24 L +4   FEV1 1.80 L +8      Lung volumes by body plethysmography:    % Predicted Z-Score   Total Lung Capacity 5.22 L 113% 1.14   Residual Volume 2.28 L 103% 0.20   RV/TLC Ratio 44 93%      DLCO corrected for hemoglobin level:    % Predicted Z-Score   DLCO 13.55 79% " "-1.28     Imaging:  I personally reviewed the images in PACS pertinent to today's visit:  CT Chest WO -- 3/24/2025  1.  No CT evidence of acute intrathoracic process. No new or enlarging pulmonary nodules are evident.  2.  Ectasia of the ascending thoracic aorta measuring 40 mm in caliber. Follow-up CT chest is recommended in 1 year.  3.  Hypoattenuating left thyroid nodule measuring 1.6 cm. Further characterization with nonemergent thyroid ultrasound is recommended.    Other studies:  Echo from OSH pending      Tawanna Plata MD  Pulmonary-Critical Care and Sleep Medicine  05/01/25    Portions of the record may have been created with voice recognition software. Occasional wrong word or \"sound a like\" substitutions may have occurred due to the inherent limitations of voice recognition software. Please read the chart carefully and recognize, using context, where substitutions have occurred.  "